# Patient Record
Sex: MALE | Race: WHITE | Employment: FULL TIME | URBAN - METROPOLITAN AREA
[De-identification: names, ages, dates, MRNs, and addresses within clinical notes are randomized per-mention and may not be internally consistent; named-entity substitution may affect disease eponyms.]

---

## 2017-01-03 RX ORDER — IBUPROFEN 800 MG/1
TABLET ORAL
Qty: 120 TABLET | Refills: 2 | Status: SHIPPED | OUTPATIENT
Start: 2017-01-03 | End: 2018-11-07 | Stop reason: SDUPTHER

## 2017-06-08 ENCOUNTER — TELEPHONE (OUTPATIENT)
Dept: FAMILY MEDICINE CLINIC | Age: 52
End: 2017-06-08

## 2017-06-08 ENCOUNTER — OFFICE VISIT (OUTPATIENT)
Dept: FAMILY MEDICINE CLINIC | Age: 52
End: 2017-06-08

## 2017-06-08 VITALS
HEIGHT: 74 IN | TEMPERATURE: 96.8 F | DIASTOLIC BLOOD PRESSURE: 84 MMHG | OXYGEN SATURATION: 98 % | SYSTOLIC BLOOD PRESSURE: 128 MMHG | HEART RATE: 58 BPM | BODY MASS INDEX: 32.03 KG/M2 | WEIGHT: 249.56 LBS

## 2017-06-08 DIAGNOSIS — I49.9 IRREGULAR HEART BEAT: ICD-10-CM

## 2017-06-08 DIAGNOSIS — Z00.00 ANNUAL PHYSICAL EXAM: Primary | ICD-10-CM

## 2017-06-08 DIAGNOSIS — Z12.5 SCREENING PSA (PROSTATE SPECIFIC ANTIGEN): ICD-10-CM

## 2017-06-08 DIAGNOSIS — Z12.11 SCREEN FOR COLON CANCER: ICD-10-CM

## 2017-06-08 DIAGNOSIS — Z00.00 ANNUAL PHYSICAL EXAM: ICD-10-CM

## 2017-06-08 LAB
CHOLESTEROL, TOTAL: 225 MG/DL (ref 0–199)
HDLC SERPL-MCNC: 43 MG/DL (ref 40–59)
LDL CHOLESTEROL CALCULATED: 165 MG/DL (ref 0–129)
PROSTATE SPECIFIC ANTIGEN: 1.39 NG/ML (ref 0–3.89)
TRIGL SERPL-MCNC: 86 MG/DL (ref 0–200)

## 2017-06-08 PROCEDURE — 99396 PREV VISIT EST AGE 40-64: CPT | Performed by: FAMILY MEDICINE

## 2017-06-08 PROCEDURE — 93000 ELECTROCARDIOGRAM COMPLETE: CPT | Performed by: FAMILY MEDICINE

## 2017-06-08 ASSESSMENT — PATIENT HEALTH QUESTIONNAIRE - PHQ9
1. LITTLE INTEREST OR PLEASURE IN DOING THINGS: 0
2. FEELING DOWN, DEPRESSED OR HOPELESS: 0
SUM OF ALL RESPONSES TO PHQ9 QUESTIONS 1 & 2: 0
SUM OF ALL RESPONSES TO PHQ QUESTIONS 1-9: 0

## 2017-12-18 ENCOUNTER — NURSE ONLY (OUTPATIENT)
Dept: FAMILY MEDICINE CLINIC | Age: 52
End: 2017-12-18

## 2017-12-18 DIAGNOSIS — Z23 NEED FOR INFLUENZA VACCINATION: Primary | ICD-10-CM

## 2017-12-18 PROCEDURE — 90471 IMMUNIZATION ADMIN: CPT | Performed by: FAMILY MEDICINE

## 2017-12-18 PROCEDURE — 90688 IIV4 VACCINE SPLT 0.5 ML IM: CPT | Performed by: FAMILY MEDICINE

## 2018-11-07 ENCOUNTER — OFFICE VISIT (OUTPATIENT)
Dept: FAMILY MEDICINE CLINIC | Age: 53
End: 2018-11-07
Payer: COMMERCIAL

## 2018-11-07 VITALS
HEIGHT: 74 IN | DIASTOLIC BLOOD PRESSURE: 82 MMHG | RESPIRATION RATE: 20 BRPM | WEIGHT: 260.6 LBS | HEART RATE: 73 BPM | TEMPERATURE: 97.3 F | SYSTOLIC BLOOD PRESSURE: 128 MMHG | BODY MASS INDEX: 33.45 KG/M2

## 2018-11-07 DIAGNOSIS — E78.5 DYSLIPIDEMIA: ICD-10-CM

## 2018-11-07 DIAGNOSIS — Z00.00 ANNUAL PHYSICAL EXAM: ICD-10-CM

## 2018-11-07 DIAGNOSIS — Z00.00 ANNUAL PHYSICAL EXAM: Primary | ICD-10-CM

## 2018-11-07 DIAGNOSIS — N40.0 BENIGN PROSTATIC HYPERPLASIA WITHOUT LOWER URINARY TRACT SYMPTOMS: ICD-10-CM

## 2018-11-07 DIAGNOSIS — N52.01 ERECTILE DYSFUNCTION DUE TO ARTERIAL INSUFFICIENCY: ICD-10-CM

## 2018-11-07 LAB
ALBUMIN SERPL-MCNC: 4.4 G/DL (ref 3.9–4.9)
ALP BLD-CCNC: 61 U/L (ref 35–104)
ALT SERPL-CCNC: 22 U/L (ref 0–41)
ANION GAP SERPL CALCULATED.3IONS-SCNC: 12 MEQ/L (ref 7–13)
AST SERPL-CCNC: 28 U/L (ref 0–40)
BASOPHILS ABSOLUTE: 0 K/UL (ref 0–0.2)
BASOPHILS RELATIVE PERCENT: 0.5 %
BILIRUB SERPL-MCNC: 1 MG/DL (ref 0–1.2)
BUN BLDV-MCNC: 22 MG/DL (ref 6–20)
CALCIUM SERPL-MCNC: 9.8 MG/DL (ref 8.6–10.2)
CHLORIDE BLD-SCNC: 102 MEQ/L (ref 98–107)
CHOLESTEROL, TOTAL: 267 MG/DL (ref 0–199)
CO2: 26 MEQ/L (ref 22–29)
CREAT SERPL-MCNC: 1 MG/DL (ref 0.7–1.2)
EOSINOPHILS ABSOLUTE: 0 K/UL (ref 0–0.7)
EOSINOPHILS RELATIVE PERCENT: 0.9 %
GFR AFRICAN AMERICAN: >60
GFR NON-AFRICAN AMERICAN: >60
GLOBULIN: 3.5 G/DL (ref 2.3–3.5)
GLUCOSE BLD-MCNC: 80 MG/DL (ref 74–109)
HCT VFR BLD CALC: 42 % (ref 42–52)
HDLC SERPL-MCNC: 47 MG/DL (ref 40–59)
HEMOGLOBIN: 14.4 G/DL (ref 14–18)
LDL CHOLESTEROL CALCULATED: 199 MG/DL (ref 0–129)
LYMPHOCYTES ABSOLUTE: 1.7 K/UL (ref 1–4.8)
LYMPHOCYTES RELATIVE PERCENT: 38 %
MCH RBC QN AUTO: 33.6 PG (ref 27–31.3)
MCHC RBC AUTO-ENTMCNC: 34.3 % (ref 33–37)
MCV RBC AUTO: 97.9 FL (ref 80–100)
MONOCYTES ABSOLUTE: 0.6 K/UL (ref 0.2–0.8)
MONOCYTES RELATIVE PERCENT: 12.7 %
NEUTROPHILS ABSOLUTE: 2.2 K/UL (ref 1.4–6.5)
NEUTROPHILS RELATIVE PERCENT: 47.9 %
PDW BLD-RTO: 13.7 % (ref 11.5–14.5)
PLATELET # BLD: 170 K/UL (ref 130–400)
POTASSIUM SERPL-SCNC: 4.5 MEQ/L (ref 3.5–5.1)
RBC # BLD: 4.29 M/UL (ref 4.7–6.1)
SODIUM BLD-SCNC: 140 MEQ/L (ref 132–144)
TOTAL PROTEIN: 7.9 G/DL (ref 6.4–8.1)
TRIGL SERPL-MCNC: 104 MG/DL (ref 0–200)
WBC # BLD: 4.5 K/UL (ref 4.8–10.8)

## 2018-11-07 PROCEDURE — 99396 PREV VISIT EST AGE 40-64: CPT | Performed by: FAMILY MEDICINE

## 2018-11-07 RX ORDER — SILDENAFIL 100 MG/1
100 TABLET, FILM COATED ORAL PRN
Qty: 10 TABLET | Refills: 3 | Status: SHIPPED | OUTPATIENT
Start: 2018-11-07

## 2018-11-07 RX ORDER — LEVOCETIRIZINE DIHYDROCHLORIDE 5 MG/1
TABLET, FILM COATED ORAL
Qty: 90 TABLET | Refills: 1 | Status: SHIPPED | OUTPATIENT
Start: 2018-11-07

## 2018-11-07 RX ORDER — IBUPROFEN 800 MG/1
TABLET ORAL
Qty: 120 TABLET | Refills: 2 | Status: SHIPPED | OUTPATIENT
Start: 2018-11-07

## 2018-11-07 ASSESSMENT — PATIENT HEALTH QUESTIONNAIRE - PHQ9
2. FEELING DOWN, DEPRESSED OR HOPELESS: 0
SUM OF ALL RESPONSES TO PHQ9 QUESTIONS 1 & 2: 0
SUM OF ALL RESPONSES TO PHQ QUESTIONS 1-9: 0
SUM OF ALL RESPONSES TO PHQ QUESTIONS 1-9: 0
1. LITTLE INTEREST OR PLEASURE IN DOING THINGS: 0

## 2018-11-07 NOTE — PROGRESS NOTES
Dyslipidemia      Past Surgical History:   Procedure Laterality Date    EYE SURGERY      UVULECTOMY      VASECTOMY           REVIEW OF SYSTEMS:   Patient seen today for exam.  Denies any problems with hearing, headaches or vision. Denies any shortness of breath, chest pain, nausea or vomiting. No black stool, no blood in thestool. No heartburn. Denies any problems with constipation or diarrhea either. No dysuria type symptoms. Objective:     /82   Pulse 73   Temp 97.3 °F (36.3 °C) (Temporal)   Resp 20   Ht 6' 2\" (1.88 m)   Wt 260 lb 9.6 oz (118.2 kg)   BMI 33.46 kg/m²     Physical Exam        O:  Alert and active male in no acute distress  HEENT:  TMs clear. Pharynx neg. Nares clear, no drainage noted  Neck supple/ no adenopathy   HEART:  RRR without murmur/ no carotid bruits  LUNGS:  Clear to auscultation bilaterally, no wheeze or rhonchi noted  THYROID: neg masses or nodularity  ABDOMEN:  Soft x4. Bowel sounds positive. No masses or organomegaly,  Negative tenderness, guarding or rebound. EXTR:  Without edema./ good pulses bilat    Neurologic exam unremarkable. DTRs in upper and lower extremities within normal limits. Full strength noted    Skin- no lesions noted   Testes descended no masses    Assessment:       Diagnosis Orders   1. Annual physical exam  Lipid Panel    Comprehensive Metabolic Panel    CBC With Auto Differential   2. Benign prostatic hyperplasia without lower urinary tract symptoms  PSA, Diagnostic   3. Dyslipidemia     4.  Erectile dysfunction due to arterial insufficiency               Plan:        Orders Placed This Encounter   Medications    ibuprofen (ADVIL;MOTRIN) 800 MG tablet     Sig: TAKE 1 TABLET BY MOUTH EVERY 6 HOURS AS NEEDED     Dispense:  120 tablet     Refill:  2    sildenafil (VIAGRA) 100 MG tablet     Sig: Take 1 tablet by mouth as needed for Erectile Dysfunction     Dispense:  10 tablet     Refill:  3    levocetirizine (XYZAL) 5 MG tablet Sig: TAKE ONE TABLET BY MOUTH EVERY EVENING     Dispense:  90 tablet     Refill:  1     Orders Placed This Encounter   Procedures    Lipid Panel     Standing Status:   Future     Standing Expiration Date:   11/7/2019     Order Specific Question:   Is Patient Fasting?/# of Hours     Answer:   n/a    Comprehensive Metabolic Panel     Standing Status:   Future     Standing Expiration Date:   11/7/2019    CBC With Auto Differential     Standing Status:   Future     Standing Expiration Date:   11/7/2019    PSA, Diagnostic     Standing Status:   Future     Standing Expiration Date:   11/7/2019           Health Maintenance Due   Topic Date Due    Colon cancer screen colonoscopy  03/09/2017        we discussed Mediterranean diet and lifestyle increase activity and no sugary drinks with his blood work and give her call to see how he does with the Viagra      Controlled Substances Monitoring:     No flowsheet data found.         If anything worsens or changes please call us at once , follow-up in the office as planned,

## 2019-03-28 ENCOUNTER — TELEPHONE (OUTPATIENT)
Dept: FAMILY MEDICINE CLINIC | Age: 54
End: 2019-03-28

## 2023-03-27 PROBLEM — R53.81 MALAISE AND FATIGUE: Status: ACTIVE | Noted: 2023-03-27

## 2023-03-27 PROBLEM — D22.9 SKIN MOLE: Status: ACTIVE | Noted: 2023-03-27

## 2023-03-27 PROBLEM — M17.9 OSTEOARTHRITIS OF KNEE: Status: ACTIVE | Noted: 2023-03-27

## 2023-03-27 PROBLEM — M23.90 INTERNAL DERANGEMENT OF KNEE: Status: ACTIVE | Noted: 2023-03-27

## 2023-03-27 PROBLEM — L25.9 CONTACT DERMATITIS: Status: ACTIVE | Noted: 2023-03-27

## 2023-03-27 PROBLEM — S83.529A TEAR OF PCL (POSTERIOR CRUCIATE LIGAMENT) OF KNEE: Status: ACTIVE | Noted: 2023-03-27

## 2023-03-27 PROBLEM — E66.811 CLASS 1 OBESITY DUE TO EXCESS CALORIES WITH SERIOUS COMORBIDITY AND BODY MASS INDEX (BMI) OF 30.0 TO 30.9 IN ADULT: Status: ACTIVE | Noted: 2023-03-27

## 2023-03-27 PROBLEM — M79.673 FOOT PAIN: Status: ACTIVE | Noted: 2023-03-27

## 2023-03-27 PROBLEM — E78.5 DYSLIPIDEMIA: Status: ACTIVE | Noted: 2023-03-27

## 2023-03-27 PROBLEM — L24.7 IRRITANT CONTACT DERMATITIS DUE TO PLANTS, EXCEPT FOOD: Status: ACTIVE | Noted: 2023-03-27

## 2023-03-27 PROBLEM — M10.9 GOUT OF RIGHT FOOT: Status: ACTIVE | Noted: 2023-03-27

## 2023-03-27 PROBLEM — S92.153A AVULSION FRACTURE OF TALUS: Status: ACTIVE | Noted: 2023-03-27

## 2023-03-27 PROBLEM — R03.0 BORDERLINE HYPERTENSION: Status: ACTIVE | Noted: 2023-03-27

## 2023-03-27 PROBLEM — M25.561 KNEE PAIN, RIGHT: Status: ACTIVE | Noted: 2023-03-27

## 2023-03-27 PROBLEM — S92.353A FRACTURE OF BASE OF FIFTH METATARSAL BONE: Status: ACTIVE | Noted: 2023-03-27

## 2023-03-27 PROBLEM — M25.871 IMPINGEMENT OF RIGHT ANKLE JOINT: Status: ACTIVE | Noted: 2023-03-27

## 2023-03-27 PROBLEM — N52.9 ERECTILE DYSFUNCTION: Status: ACTIVE | Noted: 2023-03-27

## 2023-03-27 PROBLEM — L82.1 SEBORRHEIC KERATOSIS: Status: ACTIVE | Noted: 2023-03-27

## 2023-03-27 PROBLEM — R53.83 MALAISE AND FATIGUE: Status: ACTIVE | Noted: 2023-03-27

## 2023-03-27 PROBLEM — T78.40XA ALLERGY: Status: ACTIVE | Noted: 2023-03-27

## 2023-03-27 PROBLEM — M25.569 KNEE PAIN: Status: ACTIVE | Noted: 2023-03-27

## 2023-03-27 PROBLEM — M79.671 RIGHT FOOT PAIN: Status: ACTIVE | Noted: 2023-03-27

## 2023-03-27 PROBLEM — E66.09 CLASS 1 OBESITY DUE TO EXCESS CALORIES WITH SERIOUS COMORBIDITY AND BODY MASS INDEX (BMI) OF 30.0 TO 30.9 IN ADULT: Status: ACTIVE | Noted: 2023-03-27

## 2023-03-27 RX ORDER — LEVOCETIRIZINE DIHYDROCHLORIDE 5 MG/1
1 TABLET, FILM COATED ORAL DAILY
COMMUNITY
Start: 2018-11-07 | End: 2023-07-28

## 2023-03-27 RX ORDER — MELOXICAM 15 MG/1
15 TABLET ORAL DAILY
COMMUNITY

## 2023-03-27 RX ORDER — IBUPROFEN 800 MG/1
TABLET ORAL
COMMUNITY
Start: 2019-08-21 | End: 2023-03-30 | Stop reason: SDUPTHER

## 2023-03-27 RX ORDER — SILDENAFIL 100 MG/1
TABLET, FILM COATED ORAL
COMMUNITY
Start: 2018-11-07

## 2023-03-30 ENCOUNTER — OFFICE VISIT (OUTPATIENT)
Dept: PRIMARY CARE | Facility: CLINIC | Age: 58
End: 2023-03-30
Payer: COMMERCIAL

## 2023-03-30 VITALS
OXYGEN SATURATION: 99 % | SYSTOLIC BLOOD PRESSURE: 132 MMHG | DIASTOLIC BLOOD PRESSURE: 78 MMHG | WEIGHT: 238.4 LBS | BODY MASS INDEX: 30.6 KG/M2 | HEIGHT: 74 IN | TEMPERATURE: 97.6 F | HEART RATE: 58 BPM

## 2023-03-30 DIAGNOSIS — M25.561 CHRONIC PAIN OF RIGHT KNEE: ICD-10-CM

## 2023-03-30 DIAGNOSIS — G89.29 CHRONIC PAIN OF RIGHT KNEE: ICD-10-CM

## 2023-03-30 DIAGNOSIS — L02.421 FURUNCLE OF RIGHT AXILLA: ICD-10-CM

## 2023-03-30 DIAGNOSIS — Z00.00 ROUTINE GENERAL MEDICAL EXAMINATION AT A HEALTH CARE FACILITY: Primary | ICD-10-CM

## 2023-03-30 DIAGNOSIS — F41.9 ANXIETY: ICD-10-CM

## 2023-03-30 DIAGNOSIS — Z12.11 COLON CANCER SCREENING: ICD-10-CM

## 2023-03-30 PROCEDURE — 99396 PREV VISIT EST AGE 40-64: CPT | Performed by: FAMILY MEDICINE

## 2023-03-30 PROCEDURE — 4004F PT TOBACCO SCREEN RCVD TLK: CPT | Performed by: FAMILY MEDICINE

## 2023-03-30 RX ORDER — CEPHALEXIN 500 MG/1
500 CAPSULE ORAL 2 TIMES DAILY
Qty: 20 CAPSULE | Refills: 0 | Status: SHIPPED | OUTPATIENT
Start: 2023-03-30 | End: 2023-04-09

## 2023-03-30 RX ORDER — IBUPROFEN 800 MG/1
TABLET ORAL
Qty: 270 TABLET | Refills: 1 | Status: SHIPPED | OUTPATIENT
Start: 2023-03-30

## 2023-03-30 RX ORDER — ESCITALOPRAM OXALATE 10 MG/1
10 TABLET ORAL DAILY
Qty: 30 TABLET | Refills: 5 | Status: SHIPPED | OUTPATIENT
Start: 2023-03-30 | End: 2023-09-26

## 2023-03-30 NOTE — LETTER
April 12, 2023     Adonay Stephens  [id number] [facility] [id number]      Dear Mr. Stephens:    Below are the results from your recent visit:    Resulted Orders   Cologuard® colon cancer screening   Result Value Ref Range    NONINV COLON CA DNA+OCC BLD SCRN STL QL Negative Negative      Comment:        NEGATIVE TEST RESULT. A negative Cologuard result indicates a low likelihood that a colorectal cancer (CRC) or advanced adenoma (adenomatous polyps with more advanced pre-malignant features)  is present. The chance that a person with a negative Cologuard test has a colorectal cancer is less than 1 in 1500 (negative predictive value >99.9%) or has an  advanced adenoma is less than  5.3% (negative predictive value 94.7%). These data are based on a prospective cross-sectional study of 10,000 individuals at average risk for colorectal cancer who were screened with both Cologuard and colonoscopy. (Anali Eddy al, N Engl J Med 2014;370(14):7392-4054) The normal value (reference range) for this assay is negative.    COLOGUARD RE-SCREENING RECOMMENDATION: Periodic colorectal cancer screening is an important part of preventive healthcare for asymptomatic individuals at average risk for colorectal cancer.  Following a negative Cologuard result, the American Cancer Society and U.S.  Multi-Society Task Force screening guidelines recommend a Cologuard re-screening interval of 3 years.   References: American Cancer Society Guideline for Colorectal Cancer Screening: https://www.cancer.org/cancer/colon-rectal-cancer/aecddrczm-xozfagdwp-gmthpgp/acs-recommendations.html.; Mrelin DK, Rosa PEREZ, Tito YANESK, Colorectal Cancer Screening: Recommendations for Physicians and Patients from the U.S. Multi-Society Task Force on Colorectal Cancer Screening , Am J Gastroenterology 2017; 112:4494-8494.    TEST DESCRIPTION: Composite algorithmic analysis of stool DNA-biomarkers with hemoglobin immunoassay.   Quantitative values of  individual biomarkers are not reportable and are not associated with individual biomarker result reference ranges. Cologuard is intended for colorectal cancer screening of adults of either sex, 45 years or older, who are at average-risk for colorectal cancer (CRC). Cologuard has been approved for use by the U.S. FDA. The performance of Cologuard was  established in a cross sectional study of average-risk adults aged 50-84. Cologuard performance in patients ages 45 to 49 years was estimated by sub-group analysis of near-age groups. Colonoscopies performed for a positive result may find as the most clinically significant lesion: colorectal cancer [4.0%], advanced adenoma (including sessile serrated polyps greater than or equal to 1cm diameter) [20%] or non- advanced adenoma [31%]; or no colorectal neoplasia [45%]. These estimates are derived from a prospective cross-sectional screening study of 10,000 individuals at average risk for colorectal cancer who were screened with both Cologuard and colonoscopy. (Anali MARTINEZ. et al, N Engl J Med 2014;370(14):6542-9955.) Cologuard may produce a false negative or false positive result (no colorectal cancer or precancerous polyp present at colonoscopy follow up). A negative Cologuard test result does not guarantee the absence of CRC or advanced adenoma (pre-cancer). The current Cologuard  screening interval is every 3 years. (American Cancer Society and U.S. Multi-Society Task Force). Cologuard performance data in a 10,000 patient pivotal study using colonoscopy as the reference method can be accessed at the following location: www.Inn[employer]s.AppMakr/results. Additional description of the Cologuard test process, warnings and precautions can be found at [email or web address].com.       Results within normal range. Recheck in 3 years. Follow-up as planned     If you have any questions or concerns, please don't hesitate to call.         Sincerely,        Teofilo Banerjee, DO

## 2023-03-30 NOTE — PROGRESS NOTES
"Subjective   Patient ID: Adonay Stephens is a 58 y.o. male who presents for Annual Exam and Knee Pain (Cyst).  HPI  Annual physical   Eats a generally healthy diet   Exercises infrequently   Denies any chest pain,SOB  No Abdominal pain   No black or bloody stools   Urination/BM normal   Last eye apt x 1 year ago   Last dental apt  x over 1 year   No new family h/o cancers or heart disease     Knee pain   Right x over 2 years   No specific injury noted  Has seen Orthopedics   Is taking Ibuprofen  Pain has not changes     Cyst   Right axilla x 5 days  Admits to pain when touched   Located under the skin  This is a recurring issue  Would like this checked today   No fevers or chills   Has never had these removed previously     Review of systems  ; Patient seen today for exam denies any problems with headaches or vision, denies any shortness of breath chest pain nausea or vomiting, no black stool no blood in the stool no heartburn type symptoms denies any problems with constipation or diarrhea, and no dysuria-type symptoms    The patient's allergies medications were reviewed with them today    The patient's social family and surgical history or also reviewed here today, along with her past medical history.     Objective     Alert and active in  no acute distress  HEENT TMs clear oropharynx negative nares clear no drainage noted neck supple  With no adenopathy   Heart regular rate and rhythm without murmur and no carotid bruits  Lungs- clear to auscultation bilaterally, no wheeze or rhonchi noted  Thyroid -negative masses or nodularity  Abdomen- soft times four quadrants , bowel sounds positive no masses or organomegaly, negative tenderness guarding or rebound  Neurological exam unremarkable- DTRs in upper and lower extremities within normal limits.   skin -no lesions noted    Right axilla positive for: Noted slightly tender to touch    /78   Pulse 58   Temp 36.4 °C (97.6 °F) (Temporal)   Ht 1.88 m (6' 2\")   Wt " 108 kg (238 lb 6.4 oz)   SpO2 99%   BMI 30.61 kg/m²     No Known Allergies    Assessment/Plan   Problem List Items Addressed This Visit          Other    Knee pain, right    Relevant Medications    ibuprofen 800 mg tablet     Other Visit Diagnoses       Routine general medical examination at a health care facility    -  Primary    Colon cancer screening        Relevant Orders    Cologuard® colon cancer screening    Furuncle of right axilla        Relevant Medications    cephalexin (Keflex) 500 mg capsule    Anxiety        Relevant Medications    escitalopram (Lexapro) 10 mg tablet        He will try the Lexapro for next month with his anxiety with his right eye he did buy Katina which has been great for helping him stay centered with his eye and also with right-sided blind spots      Reviewed his bladder with him if things worsen or change in any way he will let us know we will take Keflex as ordered      If anything worsens or changes please call us at once, follow up in the office as planned,

## 2023-04-10 LAB — NONINV COLON CA DNA+OCC BLD SCRN STL QL: NEGATIVE

## 2023-07-28 DIAGNOSIS — T78.40XS ALLERGY, SEQUELA: ICD-10-CM

## 2023-07-28 RX ORDER — LEVOCETIRIZINE DIHYDROCHLORIDE 5 MG/1
5 TABLET, FILM COATED ORAL DAILY
Qty: 30 TABLET | Refills: 0 | Status: SHIPPED | OUTPATIENT
Start: 2023-07-28

## 2023-10-10 ENCOUNTER — ANCILLARY PROCEDURE (OUTPATIENT)
Dept: RADIOLOGY | Facility: CLINIC | Age: 58
End: 2023-10-10
Payer: COMMERCIAL

## 2023-10-10 DIAGNOSIS — M25.569 PAIN IN UNSPECIFIED KNEE: ICD-10-CM

## 2023-10-10 DIAGNOSIS — S83.207A UNSPECIFIED TEAR OF UNSPECIFIED MENISCUS, CURRENT INJURY, LEFT KNEE, INITIAL ENCOUNTER: ICD-10-CM

## 2023-10-10 DIAGNOSIS — M25.561 PAIN IN RIGHT KNEE: ICD-10-CM

## 2023-10-10 DIAGNOSIS — S83.206A UNSPECIFIED TEAR OF UNSPECIFIED MENISCUS, CURRENT INJURY, RIGHT KNEE, INITIAL ENCOUNTER: ICD-10-CM

## 2023-10-10 PROCEDURE — 73721 MRI JNT OF LWR EXTRE W/O DYE: CPT | Mod: LT

## 2023-10-10 PROCEDURE — 73721 MRI JNT OF LWR EXTRE W/O DYE: CPT | Mod: LEFT SIDE | Performed by: RADIOLOGY

## 2023-10-11 ENCOUNTER — DOCUMENTATION (OUTPATIENT)
Dept: ORTHOPEDIC SURGERY | Facility: CLINIC | Age: 58
End: 2023-10-11
Payer: COMMERCIAL

## 2023-10-11 DIAGNOSIS — M25.569 KNEE PAIN, UNSPECIFIED CHRONICITY, UNSPECIFIED LATERALITY: Primary | ICD-10-CM

## 2023-10-11 NOTE — PROGRESS NOTES
Discussed with patient there is edema in his proximal medial tibia consistent with early insufficiency fracture, we stressed the importance of minimal weightbearing and rest however he is going elk hunting in [place].  We discussed modification of his activities as best as possible we reviewed increased rest and protected weightbearing potentially upon his return.      He does have a radial tear of the posterior horn of his medial meniscus we did message radiology as this was not mentioned.  We will see him back upon his return from the trip.

## 2023-10-31 ENCOUNTER — OFFICE VISIT (OUTPATIENT)
Dept: ORTHOPEDIC SURGERY | Facility: CLINIC | Age: 58
End: 2023-10-31
Payer: COMMERCIAL

## 2023-10-31 DIAGNOSIS — M25.562 ACUTE PAIN OF LEFT KNEE: Primary | ICD-10-CM

## 2023-10-31 PROBLEM — S83.242A OTHER TEAR OF MEDIAL MENISCUS, CURRENT INJURY, LEFT KNEE, INITIAL ENCOUNTER: Status: ACTIVE | Noted: 2023-10-31

## 2023-10-31 PROCEDURE — 4004F PT TOBACCO SCREEN RCVD TLK: CPT | Performed by: ORTHOPAEDIC SURGERY

## 2023-10-31 PROCEDURE — 99214 OFFICE O/P EST MOD 30 MIN: CPT | Mod: 57 | Performed by: ORTHOPAEDIC SURGERY

## 2023-10-31 PROCEDURE — 99214 OFFICE O/P EST MOD 30 MIN: CPT | Performed by: ORTHOPAEDIC SURGERY

## 2023-10-31 RX ORDER — NAPROXEN 500 MG/1
500 TABLET ORAL 2 TIMES DAILY
Qty: 60 TABLET | Refills: 0 | Status: SHIPPED | OUTPATIENT
Start: 2023-10-31 | End: 2023-11-30

## 2023-10-31 NOTE — H&P
History Of Present Illness  Adonay Stephens is a 58 y.o. male presenting with left knee pain, here to review MRI.     Past Medical History  He has a past medical history of Body mass index (BMI) 33.0-33.9, adult (08/23/2021), Encounter for general adult medical examination without abnormal findings (08/26/2020), Encounter for immunization (10/29/2020), Other obesity due to excess calories (08/23/2021), Personal history of other diseases of the nervous system and sense organs (02/22/2022), and Unspecified disorder of ear, unspecified ear (02/22/2022).    Surgical History  He has a past surgical history that includes Other surgical history (12/01/2020) and Other surgical history (12/01/2020).     Social History  He reports that he has never smoked. His smokeless tobacco use includes snuff. He reports current alcohol use. No history on file for drug use.    Family History  Family History   Problem Relation Name Age of Onset    Heart attack Father          Allergies  Patient has no known allergies.    Review of Systems  ROS negative to cardiovascular, pulmonary, abdominal, and neuro.     Physical Exam  Examination of knee reveals positive Joselito's test on the left with medial joint line tenderness.     Last Recorded Vitals  There were no vitals taken for this visit.    Relevant Results      Scheduled medications    Continuous medications    PRN medications    No results found for this or any previous visit (from the past 24 hour(s)).    Assessment/Plan   Diagnoses and all orders for this visit:  Acute pain of left knee  -     naproxen (Naprosyn) 500 mg tablet; Take 1 tablet (500 mg) by mouth 2 times a day.      Discussed arthroscopic procedure with the patient for partial medial meniscectomy, patient agreeable like to proceed.       I spent 10 minutes in the professional and overall care of this patient.      Rashi Rock PA-C

## 2023-10-31 NOTE — PROGRESS NOTES
History of Present Illness:   Patient returns today to review MRI.  The patient endorses persistent knee pain and persistent mechanical symptoms including locking, catching, and giving out.  These issues are refractory to multiple non-surgical treatments.    Review of Systems   GENERAL: Negative for malaise, significant weight loss, fever  MUSCULOSKELETAL: See HPI  NEURO:  Negative for numbness / tingling     Physical Examination:  Left Knee:  Skin healthy and intact  No gross varus or valgus alignment   Range of motion: no major deficits   Tenderness to palpation over joint line: medial joint line  No laxity to valgus stress  No laxity to varus stress  Negative Lachman´s test  Negative anterior drawer test  Negative posterior drawer test  Positive Joselito´s test  Neurovascular exam normal distally    Imaging  MRI: Radial tear posterior horn medial meniscus insufficiency fracture    Assessment:    Patient with a left knee medial meniscal tear.    Plan:  We discussed arthroscopy versus nonsurgical treatment for the patient´s meniscal tear.  We reviewed the blood supply to the meniscus, limited healing potential and meniscectomy versus meniscal repair.  The risks of the procedure were mentioned, including wound issues, deep venous thrombosis, pulmonary embolism and medical complications.  The anticipated timeframe for recovery and return to activity were outlined.  We discussed formal physical therapy versus home exercise program.    We mentioned the possibility of incomplete relief of pain, particularly if any chondral defects are encountered and the possibility of arthrosis of the knee related to long-term deficiencies of the meniscus.         In a face to face encounter, I evaluated the patient and performed a physical examination, discussed pertinent diagnostic studies if indicated and discussed diagnosis and management strategies with both the patient and physician assistant / nurse practitioner.  I  reviewed the PA/NP's note and agree with the documented findings and plan of care.    Discussed with the patient that he does have a tear of the medial meniscus we reviewed the images.  We discussed recovery.  There also is an insufficiency fracture and discussed protected weightbearing postoperatively.  We also reviewed the patellar femoral arthritis.    Julian Wilson MD

## 2023-11-20 ENCOUNTER — HOSPITAL ENCOUNTER (OUTPATIENT)
Dept: CARDIOLOGY | Facility: HOSPITAL | Age: 58
Discharge: HOME | End: 2023-11-20
Payer: COMMERCIAL

## 2023-11-20 DIAGNOSIS — S83.242A OTHER TEAR OF MEDIAL MENISCUS, CURRENT INJURY, LEFT KNEE, INITIAL ENCOUNTER: ICD-10-CM

## 2023-11-20 PROCEDURE — 93005 ELECTROCARDIOGRAM TRACING: CPT

## 2023-11-20 PROCEDURE — 93010 ELECTROCARDIOGRAM REPORT: CPT | Performed by: INTERNAL MEDICINE

## 2023-12-01 LAB
ATRIAL RATE: 67 BPM
P AXIS: 46 DEGREES
P OFFSET: 207 MS
P ONSET: 149 MS
PR INTERVAL: 146 MS
Q ONSET: 222 MS
QRS COUNT: 11 BEATS
QRS DURATION: 88 MS
QT INTERVAL: 406 MS
QTC CALCULATION(BAZETT): 429 MS
QTC FREDERICIA: 421 MS
R AXIS: -25 DEGREES
T AXIS: -14 DEGREES
T OFFSET: 425 MS
VENTRICULAR RATE: 67 BPM

## 2023-12-13 ENCOUNTER — LAB (OUTPATIENT)
Dept: LAB | Facility: HOSPITAL | Age: 58
End: 2023-12-13
Payer: COMMERCIAL

## 2023-12-13 DIAGNOSIS — S83.242A OTHER TEAR OF MEDIAL MENISCUS, CURRENT INJURY, LEFT KNEE, INITIAL ENCOUNTER: ICD-10-CM

## 2023-12-13 LAB
ALBUMIN SERPL BCP-MCNC: 4.3 G/DL (ref 3.4–5)
ALP SERPL-CCNC: 52 U/L (ref 33–120)
ALT SERPL W P-5'-P-CCNC: 21 U/L (ref 10–52)
ANION GAP SERPL CALC-SCNC: 11 MMOL/L (ref 10–20)
AST SERPL W P-5'-P-CCNC: 23 U/L (ref 9–39)
BASOPHILS # BLD AUTO: 0.02 X10*3/UL (ref 0–0.1)
BASOPHILS NFR BLD AUTO: 0.5 %
BILIRUB SERPL-MCNC: 0.6 MG/DL (ref 0–1.2)
BUN SERPL-MCNC: 23 MG/DL (ref 6–23)
CALCIUM SERPL-MCNC: 9.7 MG/DL (ref 8.6–10.3)
CHLORIDE SERPL-SCNC: 104 MMOL/L (ref 98–107)
CO2 SERPL-SCNC: 30 MMOL/L (ref 21–32)
CREAT SERPL-MCNC: 1.23 MG/DL (ref 0.5–1.3)
EOSINOPHIL # BLD AUTO: 0.12 X10*3/UL (ref 0–0.7)
EOSINOPHIL NFR BLD AUTO: 2.9 %
ERYTHROCYTE [DISTWIDTH] IN BLOOD BY AUTOMATED COUNT: 12.9 % (ref 11.5–14.5)
GFR SERPL CREATININE-BSD FRML MDRD: 69 ML/MIN/1.73M*2
GLUCOSE SERPL-MCNC: 77 MG/DL (ref 74–99)
HCT VFR BLD AUTO: 42.4 % (ref 41–52)
HGB BLD-MCNC: 14.2 G/DL (ref 13.5–17.5)
IMM GRANULOCYTES # BLD AUTO: 0.01 X10*3/UL (ref 0–0.7)
IMM GRANULOCYTES NFR BLD AUTO: 0.2 % (ref 0–0.9)
INR PPP: 1 (ref 0.9–1.1)
LYMPHOCYTES # BLD AUTO: 1.81 X10*3/UL (ref 1.2–4.8)
LYMPHOCYTES NFR BLD AUTO: 43.1 %
MCH RBC QN AUTO: 32.8 PG (ref 26–34)
MCHC RBC AUTO-ENTMCNC: 33.5 G/DL (ref 32–36)
MCV RBC AUTO: 98 FL (ref 80–100)
MONOCYTES # BLD AUTO: 0.66 X10*3/UL (ref 0.1–1)
MONOCYTES NFR BLD AUTO: 15.7 %
NEUTROPHILS # BLD AUTO: 1.58 X10*3/UL (ref 1.2–7.7)
NEUTROPHILS NFR BLD AUTO: 37.6 %
NRBC BLD-RTO: 0 /100 WBCS (ref 0–0)
PLATELET # BLD AUTO: 179 X10*3/UL (ref 150–450)
POTASSIUM SERPL-SCNC: 4.4 MMOL/L (ref 3.5–5.3)
PROT SERPL-MCNC: 7.5 G/DL (ref 6.4–8.2)
PROTHROMBIN TIME: 11.4 SECONDS (ref 9.8–12.8)
RBC # BLD AUTO: 4.33 X10*6/UL (ref 4.5–5.9)
SODIUM SERPL-SCNC: 141 MMOL/L (ref 136–145)
WBC # BLD AUTO: 4.2 X10*3/UL (ref 4.4–11.3)

## 2023-12-13 PROCEDURE — 80053 COMPREHEN METABOLIC PANEL: CPT

## 2023-12-13 PROCEDURE — 36415 COLL VENOUS BLD VENIPUNCTURE: CPT

## 2023-12-13 PROCEDURE — 85025 COMPLETE CBC W/AUTO DIFF WBC: CPT

## 2023-12-13 PROCEDURE — 82374 ASSAY BLOOD CARBON DIOXIDE: CPT

## 2023-12-13 PROCEDURE — 85610 PROTHROMBIN TIME: CPT

## 2023-12-19 ENCOUNTER — OFFICE VISIT (OUTPATIENT)
Dept: ORTHOPEDIC SURGERY | Facility: CLINIC | Age: 58
End: 2023-12-19
Payer: COMMERCIAL

## 2023-12-19 DIAGNOSIS — S83.242A TEAR OF MEDIAL MENISCUS OF LEFT KNEE, CURRENT, UNSPECIFIED TEAR TYPE, INITIAL ENCOUNTER: ICD-10-CM

## 2023-12-19 DIAGNOSIS — M23.92 INTERNAL DERANGEMENT OF LEFT KNEE: ICD-10-CM

## 2023-12-19 DIAGNOSIS — M17.11 PRIMARY OSTEOARTHRITIS OF RIGHT KNEE: Primary | ICD-10-CM

## 2023-12-19 PROCEDURE — 2500000004 HC RX 250 GENERAL PHARMACY W/ HCPCS (ALT 636 FOR OP/ED): Performed by: STUDENT IN AN ORGANIZED HEALTH CARE EDUCATION/TRAINING PROGRAM

## 2023-12-19 PROCEDURE — 99213 OFFICE O/P EST LOW 20 MIN: CPT | Performed by: STUDENT IN AN ORGANIZED HEALTH CARE EDUCATION/TRAINING PROGRAM

## 2023-12-19 PROCEDURE — 20610 DRAIN/INJ JOINT/BURSA W/O US: CPT | Performed by: STUDENT IN AN ORGANIZED HEALTH CARE EDUCATION/TRAINING PROGRAM

## 2023-12-19 PROCEDURE — 4004F PT TOBACCO SCREEN RCVD TLK: CPT | Performed by: STUDENT IN AN ORGANIZED HEALTH CARE EDUCATION/TRAINING PROGRAM

## 2023-12-19 RX ORDER — TRIAMCINOLONE ACETONIDE 40 MG/ML
1 INJECTION, SUSPENSION INTRA-ARTICULAR; INTRAMUSCULAR
Status: COMPLETED | OUTPATIENT
Start: 2023-12-19 | End: 2023-12-19

## 2023-12-19 RX ORDER — SODIUM CHLORIDE, SODIUM LACTATE, POTASSIUM CHLORIDE, CALCIUM CHLORIDE 600; 310; 30; 20 MG/100ML; MG/100ML; MG/100ML; MG/100ML
100 INJECTION, SOLUTION INTRAVENOUS CONTINUOUS
Status: CANCELLED | OUTPATIENT
Start: 2023-12-19

## 2023-12-19 RX ADMIN — TRIAMCINOLONE ACETONIDE 1 ML: 40 INJECTION, SUSPENSION INTRA-ARTICULAR; INTRAMUSCULAR at 14:46

## 2023-12-19 NOTE — PROGRESS NOTES
History of Present Illness:  Patient with known osteoarthritis of the knee who presents today for repeat evaluation.  The patient notes persistent pain as well as some occasional mechanical symptoms.  The patient has tried the following modalities: RICE, NSAIDs, prior arthroscopy with medial meniscectomy in 2021.    He is having worsening right knee pain, and would like CSI, he is having left knee scope soon.     Review of Systems:   GENERAL: Negative for malaise, significant weight loss, fever  MUSCULOSKELETAL: see HPI  NEURO:  Negative    Physical Examination:  Right Knee:  Skin healthy and intact  No gross swelling or ecchymosis  Alignment: Varus  Effusion: Trace  ROM:  0-120  Crepitance with range of motion  No pain with internal rotation of the hip  Tenderness to palpation: over medial and lateral joint line and with patellar compression     No laxity to valgus stress  No laxity to varus stress  Negative Lachman´s test  Negative posterior drawer test  Mild pain with Joselito´s test    Neurovascular exam normal distally  2+ DP pulse and good cap refill    Imaging:  Previous arthroscopic findings of the right knee are as follows:   Patella: 4   Trochlea: 4   Medial compartment: 2-3   Lateral compartment: 1     Assessment:  Patient with known osteoarthritis of the right knee    Plan:  We reviewed an evidence-based approach to OA of the knee.  We discussed past treatments as well options for future treatment.  We discussed NSAIDS (risks and benefits), low impact activities.   He is agreeable to CSI today in the right knee.   Will follow up after left knee scope in several weeks.     FELICIANO Mooney Inj/Asp: R knee on 12/19/2023 2:46 PM  Indications: pain  Details: 22 G needle, anterolateral approach  Medications: 1 mL triamcinolone acetonide 40 mg/mL  Procedure, treatment alternatives, risks and benefits explained, specific risks discussed. Consent was given by the patient. Immediately prior to  procedure a time out was called to verify the correct patient, procedure, equipment, support staff and site/side marked as required. Patient was prepped and draped in the usual sterile fashion.

## 2023-12-20 NOTE — PREPROCEDURE INSTRUCTIONS
Appropriate clothing, bring glasses case, center location, insurance information, remove jewerly, bring responsible adult as the . NPO after midnight. Patient will bring crutches and has been trained.

## 2023-12-26 ENCOUNTER — ANESTHESIA EVENT (OUTPATIENT)
Dept: OPERATING ROOM | Facility: HOSPITAL | Age: 58
End: 2023-12-26
Payer: COMMERCIAL

## 2023-12-26 RX ORDER — SODIUM CHLORIDE, SODIUM LACTATE, POTASSIUM CHLORIDE, CALCIUM CHLORIDE 600; 310; 30; 20 MG/100ML; MG/100ML; MG/100ML; MG/100ML
100 INJECTION, SOLUTION INTRAVENOUS CONTINUOUS
Status: CANCELLED | OUTPATIENT
Start: 2023-12-26

## 2023-12-26 RX ORDER — OXYCODONE HYDROCHLORIDE 5 MG/1
5 TABLET ORAL EVERY 4 HOURS PRN
Status: CANCELLED | OUTPATIENT
Start: 2023-12-26

## 2023-12-26 NOTE — H&P
"History Of Present Illness  Adonay Stephens is a 58 y.o. male presenting with left knee pain.     Past Medical History  He has a past medical history of Body mass index (BMI) 33.0-33.9, adult (08/23/2021), Encounter for general adult medical examination without abnormal findings (08/26/2020), Encounter for immunization (10/29/2020), Joint pain, Other obesity due to excess calories (08/23/2021), Personal history of other diseases of the nervous system and sense organs (02/22/2022), and Unspecified disorder of ear, unspecified ear (02/22/2022).    Surgical History  He has a past surgical history that includes Other surgical history (12/01/2020); Other surgical history (12/01/2020); and Knee arthroscopy w/ meniscectomy (Right).     Social History  He reports that he has never smoked. His smokeless tobacco use includes snuff. He reports current alcohol use. He reports that he does not use drugs.    Family History  Family History   Problem Relation Name Age of Onset    Heart attack Father          Allergies  Patient has no known allergies.    Review of Systems  Negative     Physical Exam  Positive Joselito's     Last Recorded Vitals  Height 1.88 m (6' 2\"), weight 113 kg (250 lb).    Relevant Results      Scheduled medications    Continuous medications    PRN medications    No results found for this or any previous visit (from the past 24 hour(s)).    Assessment/Plan   Principal Problem:    Other tear of medial meniscus, current injury, left knee, initial encounter      Discussed arthroscopic partial meniscectomy, patient agreeable.       I spent 10 minutes in the professional and overall care of this patient.      Rashi Rock PA-C    " Referring Provider (Optional): Mumtaz

## 2023-12-26 NOTE — ANESTHESIA PREPROCEDURE EVALUATION
Patient: Adonay Stephens    Procedure Information       Date/Time: 12/27/23 0800    Procedure: Arthroscopy Knee Medial Meniscectomy (Left: Knee)    Location: [facility]    Surgeons: Julian Wilson MD            Relevant Problems   Endocrine   (+) Class 1 obesity due to excess calories with serious comorbidity and body mass index (BMI) of 30.0 to 30.9 in adult      Musculoskeletal   (+) Osteoarthritis of knee      Other   (+) Gout of right foot       Clinical information reviewed:   Tobacco  Allergies  Meds   Med Hx  Surg Hx   Fam Hx  Soc Hx        NPO Detail:  No data recorded     Physical Exam    Airway  Mallampati: II     Cardiovascular   Rhythm: regular  Rate: normal     Dental    Pulmonary - normal exam     Abdominal - normal exam             Anesthesia Plan    ASA 2     general     intravenous induction   Postoperative administration of opioids is intended.  Anesthetic plan and risks discussed with patient.

## 2023-12-27 ENCOUNTER — ANESTHESIA (OUTPATIENT)
Dept: OPERATING ROOM | Facility: HOSPITAL | Age: 58
End: 2023-12-27
Payer: COMMERCIAL

## 2023-12-27 ENCOUNTER — HOSPITAL ENCOUNTER (OUTPATIENT)
Facility: HOSPITAL | Age: 58
Setting detail: OUTPATIENT SURGERY
Discharge: HOME | End: 2023-12-27
Attending: ORTHOPAEDIC SURGERY | Admitting: ORTHOPAEDIC SURGERY
Payer: COMMERCIAL

## 2023-12-27 VITALS
BODY MASS INDEX: 31.77 KG/M2 | DIASTOLIC BLOOD PRESSURE: 85 MMHG | TEMPERATURE: 97.2 F | WEIGHT: 247.58 LBS | OXYGEN SATURATION: 97 % | SYSTOLIC BLOOD PRESSURE: 135 MMHG | HEART RATE: 52 BPM | HEIGHT: 74 IN | RESPIRATION RATE: 16 BRPM

## 2023-12-27 DIAGNOSIS — M94.262 CHONDROMALACIA OF KNEE, LEFT: ICD-10-CM

## 2023-12-27 DIAGNOSIS — G89.18 POST-OPERATIVE PAIN: Primary | ICD-10-CM

## 2023-12-27 PROCEDURE — 7100000010 HC PHASE TWO TIME - EACH INCREMENTAL 1 MINUTE: Performed by: ORTHOPAEDIC SURGERY

## 2023-12-27 PROCEDURE — 2500000004 HC RX 250 GENERAL PHARMACY W/ HCPCS (ALT 636 FOR OP/ED): Performed by: ANESTHESIOLOGY

## 2023-12-27 PROCEDURE — 2500000004 HC RX 250 GENERAL PHARMACY W/ HCPCS (ALT 636 FOR OP/ED): Performed by: STUDENT IN AN ORGANIZED HEALTH CARE EDUCATION/TRAINING PROGRAM

## 2023-12-27 PROCEDURE — 3600000009 HC OR TIME - EACH INCREMENTAL 1 MINUTE - PROCEDURE LEVEL FOUR: Performed by: ORTHOPAEDIC SURGERY

## 2023-12-27 PROCEDURE — 2500000004 HC RX 250 GENERAL PHARMACY W/ HCPCS (ALT 636 FOR OP/ED): Performed by: ORTHOPAEDIC SURGERY

## 2023-12-27 PROCEDURE — 3700000002 HC GENERAL ANESTHESIA TIME - EACH INCREMENTAL 1 MINUTE: Performed by: ORTHOPAEDIC SURGERY

## 2023-12-27 PROCEDURE — 3700000001 HC GENERAL ANESTHESIA TIME - INITIAL BASE CHARGE: Performed by: ORTHOPAEDIC SURGERY

## 2023-12-27 PROCEDURE — 2500000005 HC RX 250 GENERAL PHARMACY W/O HCPCS: Performed by: ANESTHESIOLOGY

## 2023-12-27 PROCEDURE — 2500000005 HC RX 250 GENERAL PHARMACY W/O HCPCS: Performed by: ORTHOPAEDIC SURGERY

## 2023-12-27 PROCEDURE — 3600000004 HC OR TIME - INITIAL BASE CHARGE - PROCEDURE LEVEL FOUR: Performed by: ORTHOPAEDIC SURGERY

## 2023-12-27 PROCEDURE — 7100000009 HC PHASE TWO TIME - INITIAL BASE CHARGE: Performed by: ORTHOPAEDIC SURGERY

## 2023-12-27 PROCEDURE — 7100000001 HC RECOVERY ROOM TIME - INITIAL BASE CHARGE: Performed by: ORTHOPAEDIC SURGERY

## 2023-12-27 PROCEDURE — A4217 STERILE WATER/SALINE, 500 ML: HCPCS | Performed by: ORTHOPAEDIC SURGERY

## 2023-12-27 PROCEDURE — 2720000007 HC OR 272 NO HCPCS: Performed by: ORTHOPAEDIC SURGERY

## 2023-12-27 PROCEDURE — 29877 ARTHRS KNEE SURG DBRDMT/SHVG: CPT | Performed by: ORTHOPAEDIC SURGERY

## 2023-12-27 PROCEDURE — 7100000002 HC RECOVERY ROOM TIME - EACH INCREMENTAL 1 MINUTE: Performed by: ORTHOPAEDIC SURGERY

## 2023-12-27 RX ORDER — DROPERIDOL 2.5 MG/ML
0.62 INJECTION, SOLUTION INTRAMUSCULAR; INTRAVENOUS ONCE AS NEEDED
Status: DISCONTINUED | OUTPATIENT
Start: 2023-12-27 | End: 2023-12-27 | Stop reason: HOSPADM

## 2023-12-27 RX ORDER — HYDROMORPHONE HYDROCHLORIDE 1 MG/ML
INJECTION, SOLUTION INTRAMUSCULAR; INTRAVENOUS; SUBCUTANEOUS AS NEEDED
Status: DISCONTINUED | OUTPATIENT
Start: 2023-12-27 | End: 2023-12-27

## 2023-12-27 RX ORDER — SODIUM CHLORIDE 0.9 G/100ML
IRRIGANT IRRIGATION AS NEEDED
Status: DISCONTINUED | OUTPATIENT
Start: 2023-12-27 | End: 2023-12-27 | Stop reason: HOSPADM

## 2023-12-27 RX ORDER — KETOROLAC TROMETHAMINE 30 MG/ML
INJECTION, SOLUTION INTRAMUSCULAR; INTRAVENOUS AS NEEDED
Status: DISCONTINUED | OUTPATIENT
Start: 2023-12-27 | End: 2023-12-27

## 2023-12-27 RX ORDER — SODIUM CHLORIDE, SODIUM LACTATE, POTASSIUM CHLORIDE, CALCIUM CHLORIDE 600; 310; 30; 20 MG/100ML; MG/100ML; MG/100ML; MG/100ML
100 INJECTION, SOLUTION INTRAVENOUS CONTINUOUS
Status: DISCONTINUED | OUTPATIENT
Start: 2023-12-27 | End: 2023-12-27 | Stop reason: HOSPADM

## 2023-12-27 RX ORDER — MIDAZOLAM HYDROCHLORIDE 1 MG/ML
INJECTION, SOLUTION INTRAMUSCULAR; INTRAVENOUS AS NEEDED
Status: DISCONTINUED | OUTPATIENT
Start: 2023-12-27 | End: 2023-12-27

## 2023-12-27 RX ORDER — LIDOCAINE HYDROCHLORIDE 20 MG/ML
INJECTION, SOLUTION INFILTRATION; PERINEURAL AS NEEDED
Status: DISCONTINUED | OUTPATIENT
Start: 2023-12-27 | End: 2023-12-27

## 2023-12-27 RX ORDER — CEFAZOLIN SODIUM 2 G/100ML
2 INJECTION, SOLUTION INTRAVENOUS ONCE
Status: COMPLETED | OUTPATIENT
Start: 2023-12-27 | End: 2023-12-27

## 2023-12-27 RX ORDER — DIPHENHYDRAMINE HYDROCHLORIDE 50 MG/ML
12.5 INJECTION INTRAMUSCULAR; INTRAVENOUS ONCE AS NEEDED
Status: DISCONTINUED | OUTPATIENT
Start: 2023-12-27 | End: 2023-12-27 | Stop reason: HOSPADM

## 2023-12-27 RX ORDER — PROPOFOL 10 MG/ML
INJECTION, EMULSION INTRAVENOUS AS NEEDED
Status: DISCONTINUED | OUTPATIENT
Start: 2023-12-27 | End: 2023-12-27

## 2023-12-27 RX ORDER — ACETAMINOPHEN 325 MG/1
TABLET ORAL AS NEEDED
Status: DISCONTINUED | OUTPATIENT
Start: 2023-12-27 | End: 2023-12-27

## 2023-12-27 RX ORDER — LIDOCAINE HYDROCHLORIDE 10 MG/ML
0.1 INJECTION, SOLUTION EPIDURAL; INFILTRATION; INTRACAUDAL; PERINEURAL ONCE
Status: DISCONTINUED | OUTPATIENT
Start: 2023-12-27 | End: 2023-12-27 | Stop reason: HOSPADM

## 2023-12-27 RX ORDER — GLYCOPYRROLATE 0.2 MG/ML
INJECTION INTRAMUSCULAR; INTRAVENOUS AS NEEDED
Status: DISCONTINUED | OUTPATIENT
Start: 2023-12-27 | End: 2023-12-27

## 2023-12-27 RX ORDER — ASPIRIN 81 MG/1
81 TABLET ORAL 2 TIMES DAILY
Qty: 28 TABLET | Refills: 0 | Status: SHIPPED | OUTPATIENT
Start: 2023-12-27 | End: 2024-01-10

## 2023-12-27 RX ORDER — MEPERIDINE HYDROCHLORIDE 25 MG/ML
12.5 INJECTION INTRAMUSCULAR; INTRAVENOUS; SUBCUTANEOUS EVERY 10 MIN PRN
Status: DISCONTINUED | OUTPATIENT
Start: 2023-12-27 | End: 2023-12-27 | Stop reason: HOSPADM

## 2023-12-27 RX ORDER — ONDANSETRON HYDROCHLORIDE 2 MG/ML
INJECTION, SOLUTION INTRAVENOUS AS NEEDED
Status: DISCONTINUED | OUTPATIENT
Start: 2023-12-27 | End: 2023-12-27

## 2023-12-27 RX ORDER — DEXAMETHASONE SODIUM PHOSPHATE 4 MG/ML
INJECTION, SOLUTION INTRA-ARTICULAR; INTRALESIONAL; INTRAMUSCULAR; INTRAVENOUS; SOFT TISSUE AS NEEDED
Status: DISCONTINUED | OUTPATIENT
Start: 2023-12-27 | End: 2023-12-27

## 2023-12-27 RX ORDER — HYDROCODONE BITARTRATE AND ACETAMINOPHEN 5; 325 MG/1; MG/1
1 TABLET ORAL EVERY 6 HOURS PRN
Qty: 12 TABLET | Refills: 0 | Status: SHIPPED | OUTPATIENT
Start: 2023-12-27 | End: 2024-01-01

## 2023-12-27 RX ORDER — BUPIVACAINE HYDROCHLORIDE 5 MG/ML
INJECTION, SOLUTION PERINEURAL AS NEEDED
Status: DISCONTINUED | OUTPATIENT
Start: 2023-12-27 | End: 2023-12-27 | Stop reason: HOSPADM

## 2023-12-27 RX ORDER — FENTANYL CITRATE 50 UG/ML
50 INJECTION, SOLUTION INTRAMUSCULAR; INTRAVENOUS EVERY 5 MIN PRN
Status: DISCONTINUED | OUTPATIENT
Start: 2023-12-27 | End: 2023-12-27 | Stop reason: HOSPADM

## 2023-12-27 RX ORDER — LABETALOL HYDROCHLORIDE 5 MG/ML
5 INJECTION, SOLUTION INTRAVENOUS ONCE AS NEEDED
Status: DISCONTINUED | OUTPATIENT
Start: 2023-12-27 | End: 2023-12-27 | Stop reason: HOSPADM

## 2023-12-27 RX ADMIN — KETOROLAC TROMETHAMINE 30 MG: 30 INJECTION, SOLUTION INTRAMUSCULAR at 07:57

## 2023-12-27 RX ADMIN — HYDROMORPHONE HYDROCHLORIDE 1 MG: 1 INJECTION, SOLUTION INTRAMUSCULAR; INTRAVENOUS; SUBCUTANEOUS at 08:13

## 2023-12-27 RX ADMIN — ACETAMINOPHEN 975 MG: 325 TABLET ORAL at 07:45

## 2023-12-27 RX ADMIN — DEXAMETHASONE SODIUM PHOSPHATE 8 MG: 4 INJECTION, SOLUTION INTRAMUSCULAR; INTRAVENOUS at 08:04

## 2023-12-27 RX ADMIN — ONDANSETRON 4 MG: 2 INJECTION, SOLUTION INTRAMUSCULAR; INTRAVENOUS at 07:57

## 2023-12-27 RX ADMIN — GLYCOPYRROLATE 0.2 MG: 0.2 INJECTION, SOLUTION INTRAMUSCULAR; INTRAVENOUS at 07:57

## 2023-12-27 RX ADMIN — MIDAZOLAM 2 MG: 1 INJECTION INTRAMUSCULAR; INTRAVENOUS at 07:57

## 2023-12-27 RX ADMIN — SODIUM CHLORIDE, POTASSIUM CHLORIDE, SODIUM LACTATE AND CALCIUM CHLORIDE: 600; 310; 30; 20 INJECTION, SOLUTION INTRAVENOUS at 07:50

## 2023-12-27 RX ADMIN — FENTANYL CITRATE 50 MCG: 50 INJECTION, SOLUTION INTRAMUSCULAR; INTRAVENOUS at 08:56

## 2023-12-27 RX ADMIN — SODIUM CHLORIDE, POTASSIUM CHLORIDE, SODIUM LACTATE AND CALCIUM CHLORIDE 100 ML/HR: 600; 310; 30; 20 INJECTION, SOLUTION INTRAVENOUS at 06:46

## 2023-12-27 RX ADMIN — PROPOFOL 200 MG: 10 INJECTION, EMULSION INTRAVENOUS at 08:04

## 2023-12-27 RX ADMIN — FENTANYL CITRATE 50 MCG: 50 INJECTION, SOLUTION INTRAMUSCULAR; INTRAVENOUS at 09:02

## 2023-12-27 RX ADMIN — CEFAZOLIN SODIUM 2 G: 2 INJECTION, SOLUTION INTRAVENOUS at 08:10

## 2023-12-27 RX ADMIN — LIDOCAINE HYDROCHLORIDE 40 MG: 20 INJECTION, SOLUTION INFILTRATION; PERINEURAL at 08:04

## 2023-12-27 ASSESSMENT — COLUMBIA-SUICIDE SEVERITY RATING SCALE - C-SSRS
1. IN THE PAST MONTH, HAVE YOU WISHED YOU WERE DEAD OR WISHED YOU COULD GO TO SLEEP AND NOT WAKE UP?: NO
2. HAVE YOU ACTUALLY HAD ANY THOUGHTS OF KILLING YOURSELF?: NO
6. HAVE YOU EVER DONE ANYTHING, STARTED TO DO ANYTHING, OR PREPARED TO DO ANYTHING TO END YOUR LIFE?: NO

## 2023-12-27 ASSESSMENT — PAIN SCALES - GENERAL
PAINLEVEL_OUTOF10: 0 - NO PAIN
PAINLEVEL_OUTOF10: 4
PAINLEVEL_OUTOF10: 5 - MODERATE PAIN
PAINLEVEL_OUTOF10: 0 - NO PAIN
PAINLEVEL_OUTOF10: 5 - MODERATE PAIN
PAINLEVEL_OUTOF10: 5 - MODERATE PAIN

## 2023-12-27 ASSESSMENT — PAIN - FUNCTIONAL ASSESSMENT
PAIN_FUNCTIONAL_ASSESSMENT: 0-10

## 2023-12-27 NOTE — OP NOTE
Operative Note     Date: 2023  OR Location: [facility]    Name: Adonay Stephens : 1965, Age: 58 y.o., MRN: [id number], Sex: male    Diagnosis  Pre-op Diagnosis     * Other tear of medial meniscus, current injury, left knee, initial encounter [S83.242A] Post-op Diagnosis     * Chondromalacia of knee, left [M94.262]     Procedures  Left knee arthroscopy, chondroplasty patella    Surgeons      * Julian Wilson - Primary    Resident/Fellow/Other Assistant:  Surgeon(s) and Role:     * Rashi Rock PA-C - Assisting    Procedure Summary  Anesthesia: General  ASA: II  Anesthesia Staff: Anesthesiologist: Kulwinder Hanson DO  CRNA: CARLOS Rojo-CRNA  Estimated Blood Loss: 5 mL  Intra-op Medications:   Medication Name Total Dose   sodium chloride 0.9 % irrigation solution 3,000 mL   BUPivacaine HCl (Marcaine) 0.5 % (5 mg/mL) injection 25 mL   ceFAZolin in dextrose (iso-os) (Ancef) IVPB 2 g 2 g              Anesthesia Record               Intraprocedure I/O Totals       None           Specimen: No specimens collected     Staff:   Circulator: Jenfier Fonseca RN  Scrub Person: Ana Huizar           Implants:     Findings:   Operative findings: (Outerbridge classification 0-4)   Patella: 4  Trochlea: 2-3  Medial compartment: 1  Lateral compartment: 1-2    Indications:     The patient was seen in the preoperative area. The risks, benefits, complications, treatment options, non-operative alternatives, expected recovery and outcomes were discussed with the patient. The possibilities of reaction to medication, pulmonary aspiration, injury to surrounding structures, bleeding, recurrent infection, the need for additional procedures, failure to diagnose a condition, and creating a complication requiring transfusion or operation were discussed with the patient. The patient concurred with the proposed plan, giving informed consent.  The site of surgery was properly noted/marked if necessary per policy. The  patient has been actively warmed in preoperative area. Preoperative antibiotics have been ordered and given within 1 hours of incision. Venous thrombosis prophylaxis have been ordered.     Patient was noted to have internal derangement of the knee refractory to non-surgical modalities.  We discussed associated interventions and the risks of the surgery, including DVT/PE, infection, stiffness, medical complications, and incomplete relief of pre-operative symptoms.    Procedure Details:   The patient was met prior to surgery and the appropriate extremity was marked.  We reviewed recent health history and found no contraindication to proceeding with the planned procedure.  The patient was transported to the operating room and underwent general anesthesia.  The patient was positioned supine on the operative table with all kyaw prominences well-padded. A sequential compression device was placed on the contralateral leg.  A non-sterile thigh tourniquet was placed and a padded lateral thigh post.    The leg was prepped and draped in the usual sterile fashion.  A timeout was completed and antibiotic administration was in accordance with standard protocol.  The leg was exsanguinated using an Esmarch bandage and the tourniquet was inflated.  The superficial landmarks of the knee were palpated and anteromedial and anterolateral portals were created.    A diagnostic arthroscopy was performed utilizing a fluid pump with sterile saline.  The articular surface of the patella, trochlea, medial and lateral femoral condyles and tibial plateaus were evaluated.  The Outerbridge classifications are listed above.  The gutters were evaluated and no loose bodies were noted. The medial compartment was entered with valgus stress in a slightly flexed knee against the lateral post.  The assistant helped with this to facilitate visualization.  The meniscus was probed superiorly and inferiorly using a blunt probe.  The notch was inspected and  the ACL and PCL were healthy in appearance and had appropriate tension when probed.  The knee was then flexed into a figure of four position and the lateral compartment was entered.      The articular surface of the patella and trochlea had chondral wear and a component of delamination which we felt could contribute to mechanical symptoms.  A 3.5 mm aggressive plus shaver was used to debride the articular cartilage until stable margins were obtained.    The medial meniscus was noted to have some mild degenerative fraying of the intermargin but was felt to be clinically insignificant and did not require any significant meniscectomy.    The knee was irrigated and lavaged to remove and loose meniscal or chondral debris.  A second pass was made diagnostically to confirm removal of any fragments and inspect for any additional pathology.  The residual fluid was expressed from the knee.  The portals were closed using a buried 3-0 monocryl suture.  Local anesthetic was injected into the soft tissue around the portals. Sterile bandages were placed.  The patient was stable to the recovery room.    I was present and participated in the entire procedure. The Physician Assistant participated in all critical elements of the procedure under my direct supervision. The surgical incision was closed by the PA under my indirect supervision. There were no qualified residents available to assist.    The physician assistant was present for the entire case.  Given the nature of the procedure and disease process, a skilled surgical assistant was necessary for the case.  The assistant was necessary for retraction and helped directly facilitate completion of the surgery.  A certified scrub tech was at the back table managing instruments and supplies for the surgical procedure.      Complications:  None; patient tolerated the procedure well.    Disposition: PACU - hemodynamically stable.  Condition: stable         Julian Wilson  Phone Number:  [phone]

## 2023-12-27 NOTE — H&P
History and physical is reviewed, patient re evaluated, no changes.  Procedure, risks, benefits, and postoperative course were discussed with the patient and consent is reviewed.    Julian Wilson MD

## 2023-12-27 NOTE — ANESTHESIA POSTPROCEDURE EVALUATION
Patient: Adonay Stephens    Procedure Summary       Date: 12/27/23 Room / Location: [facility] OR 05 / [facility]    Anesthesia Start: 0800 Anesthesia Stop: 0843    Procedure: Arthroscopy Knee Medial Meniscectomy (Left: Knee) Diagnosis:       Chondromalacia of knee, left      (Other tear of medial meniscus, current injury, left knee, initial encounter [S83.242A])    Surgeons: Julian Wilson MD Responsible Provider: Kulwinder Hanson DO    Anesthesia Type: general ASA Status: 2            Anesthesia Type: general    Vitals Value Taken Time   /64 12/27/23 0843   Temp 36.1 12/27/23 0843   Pulse 64 12/27/23 0842   Resp 10 12/27/23 0842   SpO2 95 % 12/27/23 0842   Vitals shown include unvalidated device data.    Anesthesia Post Evaluation    Patient location during evaluation: PACU  Patient participation: complete - patient cannot participate  Level of consciousness: responsive to physical stimuli  Pain management: adequate  Airway patency: fixed obstruction  Cardiovascular status: acceptable, blood pressure returned to baseline and hemodynamically stable  Respiratory status: nonlabored ventilation, spontaneous ventilation, oral airway, face mask and unassisted  Hydration status: acceptable  Postoperative Nausea and Vomiting: none      There were no known notable events for this encounter.

## 2023-12-27 NOTE — ANESTHESIA PROCEDURE NOTES
Airway  Date/Time: 12/27/2023 8:08 AM  Urgency: elective    Airway not difficult    Staffing  Performed: CRNA   Authorized by: Kulwinder Hanson DO    Performed by: CARLOS Rojo-STEPHON  Patient location during procedure: OR    Indications and Patient Condition  Indications for airway management: anesthesia and airway protection  Spontaneous Ventilation: absent  Sedation level: deep  Preoxygenated: yes  Mask difficulty assessment: 0 - not attempted    Final Airway Details  Final airway type: supraglottic airway      Successful airway: classic  Size 5     Number of attempts at approach: 1

## 2023-12-27 NOTE — DISCHARGE INSTRUCTIONS
Post-Operative Instructions - Basic Knee Arthroscopy    Dr. Julian Wilson    Activity / Swelling:  Okay to weightbear as tolerated immediately.  A brace is NOT needed. Crutches are used for balance and support but are only needed until patient feels safe ambulating.  ICE PACK to assist with pain control   Packs should not be in direct contact with your skin  Use fairly continuously until you remove the post-op dressing  After dressing removed, use for up to 20 minutes every hour as needed for pain and swelling     Diet:  Gradually resume your normal diet as tolerated following surgery.  The evening of your surgical day, begin with liquids and/or light foods.  If you are feeling well enough the next morning, progress to your normal eating patterns    Dressing care /Showering / Hygiene:  Keep operative dressing clean, dry, and intact.     Remove dressing on Post-Op Day 3    Leave the Steri-strips (small white tapes across the incisions) in place.  If no Steri-strips or they come off with dressing cover incisions with a regular / large band aid.  Do not apply lotions or ointments to incisions.  Observe the incision sites for increased redness, drainage, or foul odor.     Showering:  Once the dressing has been removed, you may shower. Incisions may be gently cleansed with mild soap. Do not scrub or rub incisions. No baths, hot-tubs, pools, or immersive soaking of any kind until sutures are removed and incisions are healed.      Medications:  Pain:  You will be given a prescription for pain medication after your surgery.  It should be taken as needed only and in many cases is NOT needed.  The goal is to discontinue it as quickly as possible.  DO NOT drive or operate heavy machinery while taking this medication as it will make you drowsy.  Do not take any additional pain medications.  This medication often continues Tylenol / acetaminophen and NO additional acetaminophen should be taken.      You may want to consider  also taking over-the-counter stool softener and/or laxative (Colace, Senekot or Dulcolax). These medications should be taken as directed and only short term.    In addition to pain medication recommend over the counter Ibuprofen 600 mg every 6-8 hours.  Take with food and avoid if any issues with stomach/GI or kidneys.  Can also add over the counter medicine (Pepcid/omeprazole) to help protect the stomach.  Do NOT take any additional anti-inflammatories.  Do not take the ibuprofen if prior bleeding issues or history of ulcers.     Prevention of Blood Clots:  81 mg of aspirin (over the counter) to start the day after surgery for 2 weeks.   Calf pumps should be done at rest.  If you have a history of blood clots you may receive a different prescription (for example: lovenox, xarelto, eliquis)     Do NOT take if prescribed other blood thinners which will be discussed prior.    Physical therapy:  Will be discussed at first follow-up appointment but can begin immediately if scheduled, if no PT is scheduled then home therapy can begin the day after surgery ~ 4 times a day for 20 min:  prop up the heel and working on gently pushing down on thigh to promote a STRAIGHT LEG.    Quad sets: with leg straight tighten quad muscles and hold for 5 seconds.  Bending can be limited by swelling but okay to bend the leg immediately and as much as tolerated.    Driving: once off narcotics, off crutches, and good leg control is achieved.  Will be discussed at first visit.    Follow-up:         Generally 10-14 days post-op      Call with any concerns, especially calf pain, signs of infection (fever, drainage) or shortness of breath.  [phone]    General Anesthesia Discharge Instructions    About this topic  You may need general anesthesia if you need to be asleep during a procedure. Your doctor will use drugs to block the signals that go from your nerves to your brain. Doctors give general anesthesia during a surgery or procedure  to:  Allow you to sleep  Help your body be still  Relax your muscles  Help you to relax and be pain free  Keep you from remembering the surgery  Let the doctor manage your airway, breathing, and blood flow  The doctor or nurse anesthetist gives general anesthesia by a shot into your vein. Sometimes, you may breathe in a gas through a mask placed over your face.  What care is needed at home?  Ask your doctor what you need to do when you go home. Make sure you ask questions if you do not understand what the doctor says.  Your doctor may give you drugs to prevent or treat an upset stomach from the anesthetic. Take them as ordered.  If your throat is sore, suck on ice chips or popsicles to ease throat pain.  Put 2 to 3 pillows under your head and back when you lie down to help you breathe easier.  For the first 24 to 48 hours:  Do not operate heavy or dangerous machinery.  Do not make major decisions or sign important papers. You may not be able to think clearly.  Avoid beer, wine, or mixed drinks.  You are at a higher risk of falling for at least 24 hours after general anesthesia.  Take extra care when you get up.  Do not change positions quickly.  Do not rush when you need to go to the bathroom or to answer the phone.  Ask for help if you feel unsteady when you try to walk.  Wear shoes with non-slip soles and low heels.  What follow-up care is needed?  Your doctor may ask you to come back to the office to check on your progress. Be sure to keep these visits.  If you have stitches that do not dissolve or staples, you will need to have them removed. Your doctor will want to do this in 1 to 2 weeks. If the doctor used skin glue, the glue will fall off on its own.  What drugs may be needed?  The doctor may order drugs to:  Help with pain  Treat an upset stomach or throwing up  Will physical activity be limited?  You will not be allowed to drive right away after the procedure. Ask a family member or a friend to drive you  home.  Avoid trying to get out of bed without help until you are sure of your balance.  You may have to limit your activity. Talk to your doctor about if you need to limit how much you lift or limit exercise after your procedure.  What changes to diet are needed?  Start with a light diet when you are fully awake. This includes things that are easy to swallow like soups, pudding, jello, toast, and eggs. Slowly progress to your normal diet.  What problems could happen?  Low blood pressure  Breathing problems  Upset stomach or throwing up  Dizziness  Blood clots  Infection  When do I need to call the doctor?  Trouble breathing  Upset stomach or throwing up more than 3 times in the next 2 days  Dizziness  Teach Back: Helping You Understand  The Teach Back Method helps you understand the information we are giving you. After you talk with the staff, tell them in your own words what you learned. This helps to make sure the staff has described each thing clearly. It also helps to explain things that may have been confusing. Before going home, make sure you can do these:  I can tell you about my procedure.  I can tell you if I need to follow up with my doctor.  I can tell you what is good for me to eat and drink the next day.  I can tell you what I would do if I have trouble breathing, an upset stomach, or dizziness.  Where can I learn more?  [employer]  https://www.nigms.nih.gov/education/pages/factsheet_Anesthesia.aspx  N[facility] Choices  http://www.nhs.uk/conditions/Anaesthetic-general/Pages/Definition.aspx  Last Reviewed Date  2020-04-22

## 2024-01-09 ENCOUNTER — OFFICE VISIT (OUTPATIENT)
Dept: ORTHOPEDIC SURGERY | Facility: CLINIC | Age: 59
End: 2024-01-09
Payer: COMMERCIAL

## 2024-01-09 DIAGNOSIS — M17.9 OSTEOARTHRITIS OF KNEE: Primary | ICD-10-CM

## 2024-01-09 PROCEDURE — 99024 POSTOP FOLLOW-UP VISIT: CPT | Performed by: ORTHOPAEDIC SURGERY

## 2024-01-09 NOTE — PROGRESS NOTES
History of Present Illness:  Patient returns today noting minimal pain.  Denies any calf pain or shortness of breath.  States that the left knee is doing much better, however the right knee is significantly declining, states that the steroid injection is already worn off that he received on 12/19/2023.  He is inquiring about options for further imaging on the right knee as he is worried about recurrent meniscal pathology.    Physical Examination:   Left knee   Mild effusion  Healthy incisions - no active drainage  Good range of motion  No calf swelling  Negative Amanda´s test  Distal neurovascular exam intact    Operative Findings:  Patella: 4  Trochlea: 2-3  Medial compartment: 1  Lateral compartment: 1-2    Assessment:  Patient status post left knee arthroscopy with chondroplasty of the patella and trochlea and lateral tibial plateau    Plan:  Reviewed arthroscopic photos and findings.  Discussed short and long term implications for the knee.  Discussed analgesics, ice, rest.  Encouraged home exercise program, physical therapy.   We discussed with the patient we feel the left knee is doing well, would not necessarily recommend advanced imaging for the right knee at this point given his previous operative findings of grade 4 changes of the patella and trochlea as well as grade 2-3 changes medially.  Recommended treating the knee is osteoarthritis to be show this is his primary concern.  Recommended viscosupplementation following corticosteroid injections as needed.  Additionally discussed options for arthroplasty.    Rashi Rock PA-C     In a face to face encounter, I evaluated the patient and performed a physical examination, discussed pertinent diagnostic studies if indicated and discussed diagnosis and management strategies with both the patient and physician assistant / nurse practitioner.  I reviewed the PA/NP's note and agree with the documented findings and plan of care.    Patient with moderate to  severe osteoarthritis bilaterally.  Doing well on his left side status post arthroscopy.    Right knee with no effusion but tenderness over medial joint line.    Discussed that based on his persistent pain in the right knee and failure to improve with a corticosteroid injection last month we will consider viscosupplementation but do feel he may be trending towards total knee arthroplasty.    We will submit for authorization of viscosupplementation injection for right knee.    Patient has a diagnosis of knee osteoarthritis supported by radiological evidence.  There is partially preserved joint space in the patellofemoral, medial and lateral compartments of the knee on weight-bearing AP x-rays within the past two years.  Pain interferes with functional activities.  Patient has failed to respond to 6 months of treatments including activity modification, home exercise, protective weightbearing and physical therapy.  Patient has failed to respond adequately to at least 6 months trials two analgesics.  Patient has failed to respond to an adequate trial of intra-articular steroid injection with less than six weeks of pain improvement.          Julian Wilson MD

## 2024-01-23 ENCOUNTER — OFFICE VISIT (OUTPATIENT)
Dept: ORTHOPEDIC SURGERY | Facility: CLINIC | Age: 59
End: 2024-01-23
Payer: COMMERCIAL

## 2024-01-23 DIAGNOSIS — M25.569 KNEE PAIN, UNSPECIFIED CHRONICITY, UNSPECIFIED LATERALITY: Primary | ICD-10-CM

## 2024-01-23 PROCEDURE — 20610 DRAIN/INJ JOINT/BURSA W/O US: CPT | Performed by: ORTHOPAEDIC SURGERY

## 2024-01-23 PROCEDURE — 2500000004 HC RX 250 GENERAL PHARMACY W/ HCPCS (ALT 636 FOR OP/ED): Mod: JZ | Performed by: ORTHOPAEDIC SURGERY

## 2024-01-23 RX ADMIN — Medication 16.8 MG: at 14:29

## 2024-01-23 NOTE — PROGRESS NOTES
History of Present Illness:  Patient returns today for an injection with viscosupplementation. The patient endorsing knee pain refractory to cortisone injections and oral medications    Review of Systems   GENERAL: Negative for malaise, significant weight loss, fever  MUSCULOSKELETAL: see HPI  NEURO:  Negative    Physical Examination:  Trace effusion  Tenderness over medial and lateral joint line    Assessment:  Patient with known osteoarthritis of the knee    Plan:  Injection performed as detailed in the procedure note below.     L Inj/Asp: R knee on 1/23/2024 2:29 PM  Indications: pain  Details: 22 G needle, anteromedial approach  Medications: 16.8 mg sodium hyaluronate 16.8 mg/2 mL  Outcome: tolerated well, no immediate complications  Procedure, treatment alternatives, risks and benefits explained, specific risks discussed. Consent was given by the patient. Immediately prior to procedure a time out was called to verify the correct patient, procedure, equipment, support staff and site/side marked as required. Patient was prepped and draped in the usual sterile fashion.

## 2024-01-31 ENCOUNTER — OFFICE VISIT (OUTPATIENT)
Dept: ORTHOPEDIC SURGERY | Facility: CLINIC | Age: 59
End: 2024-01-31
Payer: COMMERCIAL

## 2024-01-31 DIAGNOSIS — M17.11 PRIMARY OSTEOARTHRITIS OF RIGHT KNEE: Primary | ICD-10-CM

## 2024-01-31 PROCEDURE — 99024 POSTOP FOLLOW-UP VISIT: CPT | Performed by: ORTHOPAEDIC SURGERY

## 2024-01-31 PROCEDURE — 20610 DRAIN/INJ JOINT/BURSA W/O US: CPT | Performed by: ORTHOPAEDIC SURGERY

## 2024-01-31 ASSESSMENT — PAIN - FUNCTIONAL ASSESSMENT: PAIN_FUNCTIONAL_ASSESSMENT: 0-10

## 2024-01-31 ASSESSMENT — PAIN SCALES - GENERAL: PAINLEVEL_OUTOF10: 6

## 2024-01-31 NOTE — PROGRESS NOTES
History of Present Illness:  Patient returns today for an injection with viscosupplementation. The patient endorsing knee pain refractory to cortisone injections and oral medications    Review of Systems   GENERAL: Negative for malaise, significant weight loss, fever  MUSCULOSKELETAL: see HPI  NEURO:  Negative    Physical Examination:  Trace effusion  Tenderness over medial and lateral joint line    Assessment:  Patient with known osteoarthritis of the knee    Plan:  Injection performed as detailed in the procedure note below.     L Inj/Asp: R knee on 1/31/2024 2:43 PM  Indications: pain  Details: 22 G needle, anteromedial approach  Outcome: tolerated well, no immediate complications  Procedure, treatment alternatives, risks and benefits explained, specific risks discussed. Consent was given by the patient. Immediately prior to procedure a time out was called to verify the correct patient, procedure, equipment, support staff and site/side marked as required. Patient was prepped and draped in the usual sterile fashion.

## 2024-02-07 ENCOUNTER — APPOINTMENT (OUTPATIENT)
Dept: ORTHOPEDIC SURGERY | Facility: CLINIC | Age: 59
End: 2024-02-07
Payer: COMMERCIAL

## 2024-02-13 ENCOUNTER — OFFICE VISIT (OUTPATIENT)
Dept: ORTHOPEDIC SURGERY | Facility: CLINIC | Age: 59
End: 2024-02-13
Payer: COMMERCIAL

## 2024-02-13 DIAGNOSIS — M25.569 KNEE PAIN, UNSPECIFIED CHRONICITY, UNSPECIFIED LATERALITY: Primary | ICD-10-CM

## 2024-02-13 PROCEDURE — 99024 POSTOP FOLLOW-UP VISIT: CPT | Performed by: ORTHOPAEDIC SURGERY

## 2024-02-13 PROCEDURE — 20610 DRAIN/INJ JOINT/BURSA W/O US: CPT | Mod: RT | Performed by: ORTHOPAEDIC SURGERY

## 2024-02-13 PROCEDURE — 20610 DRAIN/INJ JOINT/BURSA W/O US: CPT | Performed by: ORTHOPAEDIC SURGERY

## 2024-02-13 PROCEDURE — 2500000004 HC RX 250 GENERAL PHARMACY W/ HCPCS (ALT 636 FOR OP/ED): Mod: JZ | Performed by: ORTHOPAEDIC SURGERY

## 2024-02-13 RX ADMIN — Medication 16.8 MG: at 15:04

## 2024-02-13 NOTE — PROGRESS NOTES
History of Present Illness:  Patient returns today for an injection with viscosupplementation. The patient endorsing knee pain refractory to cortisone injections and oral medications    Review of Systems   GENERAL: Negative for malaise, significant weight loss, fever  MUSCULOSKELETAL: see HPI  NEURO:  Negative    Physical Examination:  Trace effusion  Tenderness over medial and lateral joint line    Assessment:  Patient with known osteoarthritis of the knee    Plan:  Injection performed as detailed in the procedure note below.     L Inj/Asp: R knee on 2/13/2024 3:04 PM  Indications: pain  Details: 22 G needle, anteromedial approach  Medications: 16.8 mg sodium hyaluronate 16.8 mg/2 mL  Outcome: tolerated well, no immediate complications  Procedure, treatment alternatives, risks and benefits explained, specific risks discussed. Consent was given by the patient. Immediately prior to procedure a time out was called to verify the correct patient, procedure, equipment, support staff and site/side marked as required. Patient was prepped and draped in the usual sterile fashion.         Patient still having some pain in his knee discussed the possibility of incomplete relief with the [facility] MRI may help but it may just push him towards arthroplasty.

## 2024-03-26 ENCOUNTER — OFFICE VISIT (OUTPATIENT)
Dept: ORTHOPEDIC SURGERY | Facility: CLINIC | Age: 59
End: 2024-03-26
Payer: COMMERCIAL

## 2024-03-26 DIAGNOSIS — M23.91 INTERNAL DERANGEMENT OF RIGHT KNEE: ICD-10-CM

## 2024-03-26 DIAGNOSIS — M17.11 PRIMARY OSTEOARTHRITIS OF RIGHT KNEE: ICD-10-CM

## 2024-03-26 PROCEDURE — 99213 OFFICE O/P EST LOW 20 MIN: CPT | Performed by: ORTHOPAEDIC SURGERY

## 2024-03-26 PROCEDURE — 99214 OFFICE O/P EST MOD 30 MIN: CPT | Performed by: ORTHOPAEDIC SURGERY

## 2024-03-26 RX ORDER — CYCLOBENZAPRINE HCL 10 MG
10 TABLET ORAL 3 TIMES DAILY PRN
Qty: 30 TABLET | Refills: 0 | Status: SHIPPED | OUTPATIENT
Start: 2024-03-26 | End: 2024-04-05

## 2024-03-26 RX ORDER — MELOXICAM 15 MG/1
15 TABLET ORAL
Qty: 30 TABLET | Refills: 2 | Status: SHIPPED | OUTPATIENT
Start: 2024-03-26

## 2024-03-26 ASSESSMENT — PAIN - FUNCTIONAL ASSESSMENT: PAIN_FUNCTIONAL_ASSESSMENT: 0-10

## 2024-03-26 ASSESSMENT — PAIN SCALES - GENERAL: PAINLEVEL_OUTOF10: 8

## 2024-03-26 NOTE — PROGRESS NOTES
History of Present Illness:   Patient returns today with known bilateral patellofemoral arthritis.  He had a left knee arthroscopy with some relief now increasing right knee pain over the past few weeks with some mechanical symptoms.  Has tried viscosupplementation and cortisone with no relief.    Review of Systems   GENERAL: Negative for malaise, significant weight loss, fever  MUSCULOSKELETAL: see HPI  NEURO:  Negative    Physical Examination:  Right Knee:  Skin healthy and intact  No gross swelling or ecchymosis  No significant varus or valgus malalignment  Effusion: none    ROM:  Full flexion   Full extension  No pain with internal rotation of the hip  Tenderness to palpation:   Medial and lateral joint line    No laxity to valgus stress  No laxity to varus stress  Negative Lachman´s test  Negative anterior drawer test  Negative posterior drawer test  Positive Joselito´s test    Neurovascular exam normal distally      Imaging:  MRI reviewed from 2021    Assessment:   Patient with bilateral knee patellofemoral arthritis with increasing right knee pain and some mechanical symptoms    Plan:  Reviewed with the patient the differential diagnosis discussed possibility of insufficiency fracture, meniscal pathology or worsening arthritis.  Discussed that based on failure to improve recommend MRI for further evaluation.    A nonsteroidal anti-inflammatory drug (NSAID) was prescribed.  We reviewed the risks (including gastric issues, renal issues) and benefits of the medication.  We discussed a scheduled course if no adverse reactions to help with swelling / pain.  We discussed that chronic usage should be checked with primary care physician.

## 2024-04-13 ENCOUNTER — HOSPITAL ENCOUNTER (OUTPATIENT)
Dept: RADIOLOGY | Facility: HOSPITAL | Age: 59
Discharge: HOME | End: 2024-04-13
Payer: COMMERCIAL

## 2024-04-13 DIAGNOSIS — M23.91 INTERNAL DERANGEMENT OF RIGHT KNEE: ICD-10-CM

## 2024-04-13 DIAGNOSIS — M17.11 PRIMARY OSTEOARTHRITIS OF RIGHT KNEE: ICD-10-CM

## 2024-04-13 PROCEDURE — 73721 MRI JNT OF LWR EXTRE W/O DYE: CPT | Mod: RT

## 2024-04-13 PROCEDURE — 73721 MRI JNT OF LWR EXTRE W/O DYE: CPT | Mod: RIGHT SIDE | Performed by: RADIOLOGY

## 2024-04-23 ENCOUNTER — OFFICE VISIT (OUTPATIENT)
Dept: ORTHOPEDIC SURGERY | Facility: CLINIC | Age: 59
End: 2024-04-23
Payer: COMMERCIAL

## 2024-04-23 DIAGNOSIS — M25.569 KNEE PAIN, UNSPECIFIED CHRONICITY, UNSPECIFIED LATERALITY: Primary | ICD-10-CM

## 2024-04-23 PROCEDURE — 99214 OFFICE O/P EST MOD 30 MIN: CPT | Performed by: ORTHOPAEDIC SURGERY

## 2024-04-23 PROCEDURE — 20610 DRAIN/INJ JOINT/BURSA W/O US: CPT | Performed by: ORTHOPAEDIC SURGERY

## 2024-04-23 PROCEDURE — 2500000005 HC RX 250 GENERAL PHARMACY W/O HCPCS: Performed by: ORTHOPAEDIC SURGERY

## 2024-04-23 PROCEDURE — 99213 OFFICE O/P EST LOW 20 MIN: CPT | Performed by: ORTHOPAEDIC SURGERY

## 2024-04-23 PROCEDURE — 2500000004 HC RX 250 GENERAL PHARMACY W/ HCPCS (ALT 636 FOR OP/ED): Performed by: ORTHOPAEDIC SURGERY

## 2024-04-23 RX ORDER — TRIAMCINOLONE ACETONIDE 40 MG/ML
40 INJECTION, SUSPENSION INTRA-ARTICULAR; INTRAMUSCULAR
Status: COMPLETED | OUTPATIENT
Start: 2024-04-23 | End: 2024-04-23

## 2024-04-23 RX ORDER — LIDOCAINE HYDROCHLORIDE 10 MG/ML
2 INJECTION INFILTRATION; PERINEURAL
Status: COMPLETED | OUTPATIENT
Start: 2024-04-23 | End: 2024-04-23

## 2024-04-23 RX ADMIN — LIDOCAINE HYDROCHLORIDE 2 ML: 10 INJECTION, SOLUTION INFILTRATION; PERINEURAL at 10:19

## 2024-04-23 RX ADMIN — TRIAMCINOLONE ACETONIDE 40 MG: 40 INJECTION, SUSPENSION INTRA-ARTICULAR; INTRAMUSCULAR at 10:19

## 2024-04-23 NOTE — PROGRESS NOTES
History of Present Illness:  Patient with known osteoarthritis of the bilateral knee who presents today for repeat evaluation.  The patient notes persistent pain as well as some occasional mechanical symptoms.  The patient has tried the following modalities: Rest ice Visco with no relief.    Here today to review the MRI of his right knee    Review of Systems:   GENERAL: Negative for malaise, significant weight loss, fever  MUSCULOSKELETAL: see HPI  NEURO:  Negative    Physical Examination:  Right Knee:  Skin healthy and intact  No gross swelling or ecchymosis  Alignment: Neutral  Effusion: Trace  ROM: Full  Crepitance with range of motion  No pain with internal rotation of the hip  Tenderness to palpation: over medial and lateral joint line and with patellar compression     No laxity to valgus stress  No laxity to varus stress  Negative Lachman´s test  Negative posterior drawer test  Mild pain with Joselito´s test    Neurovascular exam normal distally  2+ DP pulse and good cap refill    Imaging:  Reviewed, degenerative joint disease noted, severe patellofemoral    Assessment:  Patient with known osteoarthritis of the right knee    Plan:  We reviewed an evidence-based approach to OA of the knee.  We discussed past treatments as well options for future treatment.  We discussed NSAIDS (risks and benefits), low impact activities.    MRI reviewed discussed that he may be trending towards total knee arthroplasty.  Elected for an injection today but did discuss surgery briefly including press-fit versus conventional robotic etc.    L Inj/Asp: R knee on 4/23/2024 10:19 AM  Indications: pain  Details: 22 G needle, anteromedial approach  Medications: 2 mL lidocaine 10 mg/mL (1 %); 40 mg triamcinolone acetonide 40 mg/mL  Outcome: tolerated well, no immediate complications  Procedure, treatment alternatives, risks and benefits explained, specific risks discussed. Consent was given by the patient. Immediately prior to  procedure a time out was called to verify the correct patient, procedure, equipment, support staff and site/side marked as required. Patient was prepped and draped in the usual sterile fashion.

## 2024-06-29 DIAGNOSIS — M17.11 PRIMARY OSTEOARTHRITIS OF RIGHT KNEE: ICD-10-CM

## 2024-06-29 DIAGNOSIS — M23.91 INTERNAL DERANGEMENT OF RIGHT KNEE: ICD-10-CM

## 2024-07-01 RX ORDER — MELOXICAM 15 MG/1
TABLET ORAL
Qty: 30 TABLET | Refills: 2 | Status: SHIPPED | OUTPATIENT
Start: 2024-07-01

## 2024-07-02 NOTE — PROGRESS NOTES
"Subjective   Patient ID: Adonay Stephens is a 59 y.o. male who presents for Annual Exam and Sinus Problem.    HPI    Annual physical   Eats a generally healthy diet   Staying active   Denies any chest pain, SOB  No Abdominal pain   No black or bloody stools   Urination/BM normal   Last eye apt 5/2024  Last dental apt 2 to 3 yrs  No new family h/o cancers or heart disease    Sinus issues x 3 month   Symptoms include sinus pressure, stuffiness  Pt is taking Advil Cold & Sinus no relief     Denies drainage.       No other concern /question     Review of systems  ; Patient seen today for exam denies any problems with headaches or vision, denies any shortness of breath chest pain nausea or vomiting, no black stool no blood in the stool no heartburn type symptoms denies any problems with constipation or diarrhea, and no dysuria-type symptoms    The patient's allergies medications were reviewed with them today    The patient's social family and surgical history or also reviewed here today, along with her past medical history.     Objective     Alert and active in  no acute distress  HEENT TMs clear oropharynx negative nares clear no drainage noted neck supple  With no adenopathy   Heart regular rate and rhythm without murmur and no carotid bruits  Lungs- clear to auscultation bilaterally, no wheeze or rhonchi noted  Thyroid -negative masses or nodularity  Abdomen- soft times four quadrants , bowel sounds positive no masses or organomegaly, negative tenderness guarding or rebound  Neurological exam unremarkable- DTRs in upper and lower extremities within normal limits.   skin -no lesions noted      /72 (BP Location: Left arm, Patient Position: Sitting, BP Cuff Size: Large adult)   Pulse 54   Temp 36.3 °C (97.3 °F) (Temporal)   Resp 16   Ht 1.88 m (6' 2\")   Wt 114 kg (252 lb)   SpO2 98%   BMI 32.35 kg/m²     No Known Allergies    Assessment/Plan   Problem List Items Addressed This Visit       Borderline " hypertension    Relevant Orders    CBC and Auto Differential    Comprehensive Metabolic Panel    Dyslipidemia    Relevant Orders    Lipid Panel    Erectile dysfunction    Malaise and fatigue    Internal derangement of knee    Osteoarthritis of knee    BMI 32.0-32.9,adult     Other Visit Diagnoses       Routine general medical examination at a health care facility    -  Primary    Prostate cancer screening        Relevant Orders    Prostate Specific Antigen, Screen    Class 1 obesity due to excess calories without serious comorbidity with body mass index (BMI) of 32.0 to 32.9 in adult        Seasonal allergies              Discussed patient's BMI and to institute calorie reduction and increase exercise to decrease risk of diabetes and heart disease in the future.    Refill Flexeril.     Recommend biking to strengthen knees.     Celebrex prescribed today.   Take this medication with food.     Singulair prescribed today.     Labs have been ordered, she/he will have these performed and we will contact her/him with results.  (CBC, CMP, Lipid, PSA)    If anything worsens or changes please call us at once, follow up in the office as planned.    Scribe Attestation  By signing my name below, I, Kelly Vang MA, Scribe   attest that this documentation has been prepared under the direction and in the presence of Teofilo Banerjee DO.

## 2024-07-08 ENCOUNTER — APPOINTMENT (OUTPATIENT)
Dept: PRIMARY CARE | Facility: CLINIC | Age: 59
End: 2024-07-08
Payer: COMMERCIAL

## 2024-07-08 ENCOUNTER — LAB (OUTPATIENT)
Dept: LAB | Facility: LAB | Age: 59
End: 2024-07-08
Payer: COMMERCIAL

## 2024-07-08 VITALS
HEIGHT: 74 IN | RESPIRATION RATE: 16 BRPM | SYSTOLIC BLOOD PRESSURE: 118 MMHG | OXYGEN SATURATION: 98 % | TEMPERATURE: 97.3 F | HEART RATE: 54 BPM | WEIGHT: 252 LBS | BODY MASS INDEX: 32.34 KG/M2 | DIASTOLIC BLOOD PRESSURE: 72 MMHG

## 2024-07-08 DIAGNOSIS — J30.2 SEASONAL ALLERGIES: ICD-10-CM

## 2024-07-08 DIAGNOSIS — M23.91 INTERNAL DERANGEMENT OF RIGHT KNEE: ICD-10-CM

## 2024-07-08 DIAGNOSIS — Z12.5 PROSTATE CANCER SCREENING: ICD-10-CM

## 2024-07-08 DIAGNOSIS — N52.9 ERECTILE DYSFUNCTION, UNSPECIFIED ERECTILE DYSFUNCTION TYPE: ICD-10-CM

## 2024-07-08 DIAGNOSIS — R03.0 BORDERLINE HYPERTENSION: ICD-10-CM

## 2024-07-08 DIAGNOSIS — M17.9 OSTEOARTHRITIS OF KNEE, UNSPECIFIED LATERALITY, UNSPECIFIED OSTEOARTHRITIS TYPE: ICD-10-CM

## 2024-07-08 DIAGNOSIS — E78.5 DYSLIPIDEMIA: ICD-10-CM

## 2024-07-08 DIAGNOSIS — R53.81 MALAISE AND FATIGUE: ICD-10-CM

## 2024-07-08 DIAGNOSIS — E66.09 CLASS 1 OBESITY DUE TO EXCESS CALORIES WITHOUT SERIOUS COMORBIDITY WITH BODY MASS INDEX (BMI) OF 32.0 TO 32.9 IN ADULT: ICD-10-CM

## 2024-07-08 DIAGNOSIS — Z00.00 ROUTINE GENERAL MEDICAL EXAMINATION AT A HEALTH CARE FACILITY: Primary | ICD-10-CM

## 2024-07-08 DIAGNOSIS — M17.11 PRIMARY OSTEOARTHRITIS OF RIGHT KNEE: ICD-10-CM

## 2024-07-08 DIAGNOSIS — R53.83 MALAISE AND FATIGUE: ICD-10-CM

## 2024-07-08 LAB
ALBUMIN SERPL BCP-MCNC: 4.4 G/DL (ref 3.4–5)
ALP SERPL-CCNC: 51 U/L (ref 33–120)
ALT SERPL W P-5'-P-CCNC: 20 U/L (ref 10–52)
ANION GAP SERPL CALC-SCNC: 11 MMOL/L (ref 10–20)
AST SERPL W P-5'-P-CCNC: 25 U/L (ref 9–39)
BASOPHILS # BLD AUTO: 0.02 X10*3/UL (ref 0–0.1)
BASOPHILS NFR BLD AUTO: 0.5 %
BILIRUB SERPL-MCNC: 1.2 MG/DL (ref 0–1.2)
BUN SERPL-MCNC: 27 MG/DL (ref 6–23)
CALCIUM SERPL-MCNC: 9.5 MG/DL (ref 8.6–10.3)
CHLORIDE SERPL-SCNC: 108 MMOL/L (ref 98–107)
CHOLEST SERPL-MCNC: 232 MG/DL (ref 0–199)
CHOLESTEROL/HDL RATIO: 4.9
CO2 SERPL-SCNC: 27 MMOL/L (ref 21–32)
CREAT SERPL-MCNC: 1.28 MG/DL (ref 0.5–1.3)
EGFRCR SERPLBLD CKD-EPI 2021: 65 ML/MIN/1.73M*2
EOSINOPHIL # BLD AUTO: 0.06 X10*3/UL (ref 0–0.7)
EOSINOPHIL NFR BLD AUTO: 1.4 %
ERYTHROCYTE [DISTWIDTH] IN BLOOD BY AUTOMATED COUNT: 13.9 % (ref 11.5–14.5)
GLUCOSE SERPL-MCNC: 95 MG/DL (ref 74–99)
HCT VFR BLD AUTO: 39.6 % (ref 41–52)
HDLC SERPL-MCNC: 47.2 MG/DL
HGB BLD-MCNC: 13.1 G/DL (ref 13.5–17.5)
IMM GRANULOCYTES # BLD AUTO: 0.01 X10*3/UL (ref 0–0.7)
IMM GRANULOCYTES NFR BLD AUTO: 0.2 % (ref 0–0.9)
LDLC SERPL CALC-MCNC: 161 MG/DL
LYMPHOCYTES # BLD AUTO: 1.82 X10*3/UL (ref 1.2–4.8)
LYMPHOCYTES NFR BLD AUTO: 42.6 %
MCH RBC QN AUTO: 32.9 PG (ref 26–34)
MCHC RBC AUTO-ENTMCNC: 33.1 G/DL (ref 32–36)
MCV RBC AUTO: 100 FL (ref 80–100)
MONOCYTES # BLD AUTO: 0.54 X10*3/UL (ref 0.1–1)
MONOCYTES NFR BLD AUTO: 12.6 %
NEUTROPHILS # BLD AUTO: 1.82 X10*3/UL (ref 1.2–7.7)
NEUTROPHILS NFR BLD AUTO: 42.7 %
NON HDL CHOLESTEROL: 185 MG/DL (ref 0–149)
NRBC BLD-RTO: 0 /100 WBCS (ref 0–0)
PLATELET # BLD AUTO: 167 X10*3/UL (ref 150–450)
POTASSIUM SERPL-SCNC: 4 MMOL/L (ref 3.5–5.3)
PROT SERPL-MCNC: 7.3 G/DL (ref 6.4–8.2)
PSA SERPL-MCNC: 3.58 NG/ML
RBC # BLD AUTO: 3.98 X10*6/UL (ref 4.5–5.9)
SODIUM SERPL-SCNC: 142 MMOL/L (ref 136–145)
TRIGL SERPL-MCNC: 117 MG/DL (ref 0–149)
VLDL: 23 MG/DL (ref 0–40)
WBC # BLD AUTO: 4.3 X10*3/UL (ref 4.4–11.3)

## 2024-07-08 PROCEDURE — 3008F BODY MASS INDEX DOCD: CPT | Performed by: FAMILY MEDICINE

## 2024-07-08 PROCEDURE — 99396 PREV VISIT EST AGE 40-64: CPT | Performed by: FAMILY MEDICINE

## 2024-07-08 PROCEDURE — 80061 LIPID PANEL: CPT

## 2024-07-08 PROCEDURE — 36415 COLL VENOUS BLD VENIPUNCTURE: CPT

## 2024-07-08 PROCEDURE — 80053 COMPREHEN METABOLIC PANEL: CPT

## 2024-07-08 PROCEDURE — 84153 ASSAY OF PSA TOTAL: CPT

## 2024-07-08 PROCEDURE — 85025 COMPLETE CBC W/AUTO DIFF WBC: CPT

## 2024-07-08 RX ORDER — CELECOXIB 200 MG/1
200 CAPSULE ORAL DAILY
Qty: 30 CAPSULE | Refills: 2 | Status: SHIPPED | OUTPATIENT
Start: 2024-07-08 | End: 2025-01-04

## 2024-07-08 RX ORDER — MONTELUKAST SODIUM 10 MG/1
10 TABLET ORAL NIGHTLY
Qty: 90 TABLET | Refills: 0 | Status: SHIPPED | OUTPATIENT
Start: 2024-07-08

## 2024-07-08 RX ORDER — CYCLOBENZAPRINE HCL 10 MG
10 TABLET ORAL 3 TIMES DAILY PRN
Qty: 30 TABLET | Refills: 5 | Status: SHIPPED | OUTPATIENT
Start: 2024-07-08 | End: 2024-08-17

## 2024-07-08 RX ORDER — MONTELUKAST SODIUM 10 MG/1
10 TABLET ORAL NIGHTLY
Qty: 30 TABLET | Refills: 2 | Status: SHIPPED | OUTPATIENT
Start: 2024-07-08 | End: 2024-07-08

## 2024-07-08 ASSESSMENT — PATIENT HEALTH QUESTIONNAIRE - PHQ9
1. LITTLE INTEREST OR PLEASURE IN DOING THINGS: NOT AT ALL
SUM OF ALL RESPONSES TO PHQ9 QUESTIONS 1 AND 2: 0
2. FEELING DOWN, DEPRESSED OR HOPELESS: NOT AT ALL

## 2024-07-10 ENCOUNTER — TELEPHONE (OUTPATIENT)
Dept: PRIMARY CARE | Facility: CLINIC | Age: 59
End: 2024-07-10
Payer: COMMERCIAL

## 2024-07-10 DIAGNOSIS — E78.00 ELEVATED CHOLESTEROL: ICD-10-CM

## 2024-07-10 RX ORDER — ROSUVASTATIN CALCIUM 10 MG/1
10 TABLET, COATED ORAL DAILY
Qty: 30 TABLET | Refills: 2 | Status: SHIPPED | OUTPATIENT
Start: 2024-07-10

## 2024-07-10 NOTE — TELEPHONE ENCOUNTER
----- Message from Teofilo Banerjee sent at 7/10/2024  1:37 PM EDT -----   cholesterol is too high, also remind him he should should not take meloxicam and Celebrex together we would recommend a low-dose cholesterol pill Crestor 10 mg 1 each day #30 with 2 refills recheck his blood test in 4 to 6 months

## 2024-07-23 ENCOUNTER — OFFICE VISIT (OUTPATIENT)
Dept: ORTHOPEDIC SURGERY | Facility: CLINIC | Age: 59
End: 2024-07-23
Payer: COMMERCIAL

## 2024-07-23 DIAGNOSIS — M25.569 KNEE PAIN, UNSPECIFIED CHRONICITY, UNSPECIFIED LATERALITY: Primary | ICD-10-CM

## 2024-07-23 PROCEDURE — 2500000005 HC RX 250 GENERAL PHARMACY W/O HCPCS: Performed by: ORTHOPAEDIC SURGERY

## 2024-07-23 PROCEDURE — 99214 OFFICE O/P EST MOD 30 MIN: CPT | Performed by: ORTHOPAEDIC SURGERY

## 2024-07-23 PROCEDURE — 2500000004 HC RX 250 GENERAL PHARMACY W/ HCPCS (ALT 636 FOR OP/ED): Performed by: ORTHOPAEDIC SURGERY

## 2024-07-23 PROCEDURE — 99213 OFFICE O/P EST LOW 20 MIN: CPT | Mod: 57 | Performed by: ORTHOPAEDIC SURGERY

## 2024-07-23 PROCEDURE — 20610 DRAIN/INJ JOINT/BURSA W/O US: CPT | Mod: 50 | Performed by: ORTHOPAEDIC SURGERY

## 2024-07-23 RX ORDER — TRIAMCINOLONE ACETONIDE 40 MG/ML
40 INJECTION, SUSPENSION INTRA-ARTICULAR; INTRAMUSCULAR
Status: COMPLETED | OUTPATIENT
Start: 2024-07-23 | End: 2024-07-23

## 2024-07-23 RX ORDER — LIDOCAINE HYDROCHLORIDE 10 MG/ML
2 INJECTION INFILTRATION; PERINEURAL
Status: COMPLETED | OUTPATIENT
Start: 2024-07-23 | End: 2024-07-23

## 2024-07-23 NOTE — PROGRESS NOTES
History of Present Illness:  Patient with known osteoarthritis of the knee who presents today for repeat evaluation.  The patient notes persistent pain as well as some occasional mechanical symptoms.  The patient has tried the following modalities: rest, ice, visco, cs.    Some relief with cortisone but like to discuss more definitive solutions.    Review of Systems:   GENERAL: Negative for malaise, significant weight loss, fever  MUSCULOSKELETAL: see HPI  NEURO:  Negative    Physical Examination:  Bilateral Knee:  Skin healthy and intact  No gross swelling or ecchymosis  Alignment: Neutral  Effusion: Trace  ROM:  full  Crepitance with range of motion  No pain with internal rotation of the hip  Tenderness to palpation: over medial and lateral joint line and with patellar compression     No laxity to valgus stress  No laxity to varus stress  Negative Lachman´s test  Negative posterior drawer test  Mild pain with Joselito´s test    Neurovascular exam normal distally  2+ DP pulse and good cap refill    Imaging:  Reviewed, degenerative joint disease noted severe patellofemoral    Assessment:  Patient with known osteoarthritis of the bilateral knee    Plan:  We reviewed an evidence-based approach to OA of the knee.  We discussed past treatments as well options for future treatment.  We discussed NSAIDS (risks and benefits), low impact activities.    We discussed that he is likely trending towards total knee arthroplasty reviewed the procedure risks, benefits and anticipated recovery with him.  He has some trips coming up this fall and likely discuss after that.  We reviewed a variety of considerations for him in regards to planning and postoperative care.    L Inj/Asp: bilateral knee on 7/23/2024 9:18 AM  Indications: pain  Details: 22 G needle, anteromedial approach  Medications (Right): 2 mL lidocaine 10 mg/mL (1 %); 40 mg triamcinolone acetonide 40 mg/mL  Medications (Left): 2 mL lidocaine 10 mg/mL (1 %); 40 mg  triamcinolone acetonide 40 mg/mL  Outcome: tolerated well, no immediate complications  Procedure, treatment alternatives, risks and benefits explained, specific risks discussed. Consent was given by the patient. Immediately prior to procedure a time out was called to verify the correct patient, procedure, equipment, support staff and site/side marked as required. Patient was prepped and draped in the usual sterile fashion.

## 2024-10-03 DIAGNOSIS — M17.9 OSTEOARTHRITIS OF KNEE, UNSPECIFIED LATERALITY, UNSPECIFIED OSTEOARTHRITIS TYPE: ICD-10-CM

## 2024-10-03 DIAGNOSIS — E78.00 ELEVATED CHOLESTEROL: ICD-10-CM

## 2024-10-03 RX ORDER — CELECOXIB 200 MG/1
200 CAPSULE ORAL DAILY
Qty: 30 CAPSULE | Refills: 2 | Status: SHIPPED | OUTPATIENT
Start: 2024-10-03

## 2024-10-03 RX ORDER — ROSUVASTATIN CALCIUM 10 MG/1
10 TABLET, COATED ORAL DAILY
Qty: 30 TABLET | Refills: 2 | Status: SHIPPED | OUTPATIENT
Start: 2024-10-03

## 2024-10-24 ENCOUNTER — HOSPITAL ENCOUNTER (OUTPATIENT)
Facility: HOSPITAL | Age: 59
Setting detail: OUTPATIENT SURGERY
End: 2024-10-24
Attending: ORTHOPAEDIC SURGERY | Admitting: ORTHOPAEDIC SURGERY
Payer: COMMERCIAL

## 2024-10-24 ENCOUNTER — APPOINTMENT (OUTPATIENT)
Dept: ORTHOPEDIC SURGERY | Facility: CLINIC | Age: 59
End: 2024-10-24
Payer: COMMERCIAL

## 2024-10-24 DIAGNOSIS — S83.242A TEAR OF MEDIAL MENISCUS OF LEFT KNEE, CURRENT, UNSPECIFIED TEAR TYPE, INITIAL ENCOUNTER: Primary | ICD-10-CM

## 2024-10-24 PROBLEM — M17.11 UNILATERAL PRIMARY OSTEOARTHRITIS, RIGHT KNEE: Status: ACTIVE | Noted: 2024-10-24

## 2024-10-24 PROCEDURE — 99214 OFFICE O/P EST MOD 30 MIN: CPT | Performed by: ORTHOPAEDIC SURGERY

## 2024-10-24 PROCEDURE — 20610 DRAIN/INJ JOINT/BURSA W/O US: CPT | Performed by: ORTHOPAEDIC SURGERY

## 2024-10-24 RX ORDER — TRIAMCINOLONE ACETONIDE 40 MG/ML
40 INJECTION, SUSPENSION INTRA-ARTICULAR; INTRAMUSCULAR
Status: COMPLETED | OUTPATIENT
Start: 2024-10-24 | End: 2024-10-24

## 2024-10-24 RX ORDER — LIDOCAINE HYDROCHLORIDE 10 MG/ML
2 INJECTION, SOLUTION INFILTRATION; PERINEURAL
Status: COMPLETED | OUTPATIENT
Start: 2024-10-24 | End: 2024-10-24

## 2024-10-24 NOTE — PROGRESS NOTES
History of Present Illness:  Patient with known osteoarthritis of the knee who presents today for repeat evaluation.  The patient notes persistent pain as well as some occasional mechanical symptoms.  The patient has tried the following modalities: Rest ice, anti-inflammatories.    He is traveling to [place] next month.    Review of Systems:   GENERAL: Negative for malaise, significant weight loss, fever  MUSCULOSKELETAL: see HPI  NEURO:  Negative    Physical Examination:  Bilateral Knee:  Skin healthy and intact  No gross swelling or ecchymosis  Effusion: Trace  ROM:  slight decrease  Crepitance with range of motion  No pain with internal rotation of the hip  Tenderness to palpation: over medial and lateral joint line and with patellar compression     No laxity to valgus stress  No laxity to varus stress  Negative Lachman´s test  Negative posterior drawer test  Mild pain with Joselito´s test    Neurovascular exam normal distally  2+ DP pulse and good cap refill    Imaging:  Reviewed, degenerative joint disease noted     Assessment:  Patient with known osteoarthritis of bilateral knees    Plan:  We reviewed an evidence-based approach to OA of the knee.  We discussed past treatments as well options for future treatment.  We discussed NSAIDS (risks and benefits), low impact activities.    The patient has failed to improve with multiple non-operative modalities.  There is increasing difficulty with activities of daily living and concern for falls.  The patient is endorsing severe pain and disability.      We had a lengthy discussion regarding total knee arthroplasty including the orthopaedic risks, including but not limited to: stiffness, infection, hematoma, early aseptic loosening, neurologic or vascular injury, clicking, difficulty kneeling and incomplete relief of pain.  We reviewed the medical risks, including but not limited to: deep venous thrombosis, pulmonary embolism, and cardiovascular/pulmonary  issues.    We discussed the anticipated longevity of the implants and potential for revision surgery.  We also discussed anticoagulation, rehabilitation goals, and the hospital course.  We discussed the likelihood of opioid analgesics and risks associated with them.    The next step is to obtain medical risk stratification and encouraged the pre-operative information seminar at the hospital.  We are happy to provide assistance and counseling if the patient has any additional concerns.      L Inj/Asp: L knee on 10/24/2024 3:45 PM  Indications: pain  Details: 22 G needle, anteromedial approach  Medications: 2 mL lidocaine 10 mg/mL (1 %); 40 mg triamcinolone acetonide 40 mg/mL  Outcome: tolerated well, no immediate complications  Procedure, treatment alternatives, risks and benefits explained, specific risks discussed. Consent was given by the patient. Immediately prior to procedure a time out was called to verify the correct patient, procedure, equipment, support staff and site/side marked as required. Patient was prepped and draped in the usual sterile fashion.

## 2024-10-25 ENCOUNTER — LAB (OUTPATIENT)
Dept: LAB | Facility: LAB | Age: 59
End: 2024-10-25
Payer: COMMERCIAL

## 2024-10-25 ENCOUNTER — ANCILLARY PROCEDURE (OUTPATIENT)
Dept: CARDIOLOGY | Facility: CLINIC | Age: 59
End: 2024-10-25
Payer: COMMERCIAL

## 2024-10-25 DIAGNOSIS — M17.11 UNILATERAL PRIMARY OSTEOARTHRITIS, RIGHT KNEE: ICD-10-CM

## 2024-10-25 DIAGNOSIS — E78.00 ELEVATED CHOLESTEROL: ICD-10-CM

## 2024-10-25 LAB
ALBUMIN SERPL BCP-MCNC: 4.2 G/DL (ref 3.4–5)
ALP SERPL-CCNC: 44 U/L (ref 33–120)
ALT SERPL W P-5'-P-CCNC: 19 U/L (ref 10–52)
ANION GAP SERPL CALC-SCNC: 11 MMOL/L (ref 10–20)
APTT PPP: 30 SECONDS (ref 27–38)
AST SERPL W P-5'-P-CCNC: 20 U/L (ref 9–39)
BASOPHILS # BLD AUTO: 0.03 X10*3/UL (ref 0–0.1)
BASOPHILS NFR BLD AUTO: 0.6 %
BILIRUB SERPL-MCNC: 0.7 MG/DL (ref 0–1.2)
BUN SERPL-MCNC: 20 MG/DL (ref 6–23)
CALCIUM SERPL-MCNC: 9.2 MG/DL (ref 8.6–10.3)
CHLORIDE SERPL-SCNC: 106 MMOL/L (ref 98–107)
CHOLEST SERPL-MCNC: 175 MG/DL (ref 0–199)
CHOLESTEROL/HDL RATIO: 3.7
CO2 SERPL-SCNC: 26 MMOL/L (ref 21–32)
CREAT SERPL-MCNC: 1.21 MG/DL (ref 0.5–1.3)
EGFRCR SERPLBLD CKD-EPI 2021: 70 ML/MIN/1.73M*2
EOSINOPHIL # BLD AUTO: 0.09 X10*3/UL (ref 0–0.7)
EOSINOPHIL NFR BLD AUTO: 1.9 %
ERYTHROCYTE [DISTWIDTH] IN BLOOD BY AUTOMATED COUNT: 13.2 % (ref 11.5–14.5)
EST. AVERAGE GLUCOSE BLD GHB EST-MCNC: 108 MG/DL
GLUCOSE SERPL-MCNC: 97 MG/DL (ref 74–99)
HBA1C MFR BLD: 5.4 %
HCT VFR BLD AUTO: 41.5 % (ref 41–52)
HDLC SERPL-MCNC: 47.9 MG/DL
HGB BLD-MCNC: 14.2 G/DL (ref 13.5–17.5)
IMM GRANULOCYTES # BLD AUTO: 0.01 X10*3/UL (ref 0–0.7)
IMM GRANULOCYTES NFR BLD AUTO: 0.2 % (ref 0–0.9)
INR PPP: 1 (ref 0.9–1.1)
LDLC SERPL CALC-MCNC: 115 MG/DL
LYMPHOCYTES # BLD AUTO: 1.82 X10*3/UL (ref 1.2–4.8)
LYMPHOCYTES NFR BLD AUTO: 38.8 %
MCH RBC QN AUTO: 33.6 PG (ref 26–34)
MCHC RBC AUTO-ENTMCNC: 34.2 G/DL (ref 32–36)
MCV RBC AUTO: 98 FL (ref 80–100)
MONOCYTES # BLD AUTO: 0.55 X10*3/UL (ref 0.1–1)
MONOCYTES NFR BLD AUTO: 11.7 %
NEUTROPHILS # BLD AUTO: 2.19 X10*3/UL (ref 1.2–7.7)
NEUTROPHILS NFR BLD AUTO: 46.8 %
NON HDL CHOLESTEROL: 127 MG/DL (ref 0–149)
NRBC BLD-RTO: 0 /100 WBCS (ref 0–0)
PLATELET # BLD AUTO: 170 X10*3/UL (ref 150–450)
POTASSIUM SERPL-SCNC: 4.3 MMOL/L (ref 3.5–5.3)
PROT SERPL-MCNC: 7.1 G/DL (ref 6.4–8.2)
PROTHROMBIN TIME: 11 SECONDS (ref 9.8–12.8)
RBC # BLD AUTO: 4.22 X10*6/UL (ref 4.5–5.9)
SODIUM SERPL-SCNC: 139 MMOL/L (ref 136–145)
TRIGL SERPL-MCNC: 63 MG/DL (ref 0–149)
VLDL: 13 MG/DL (ref 0–40)
WBC # BLD AUTO: 4.7 X10*3/UL (ref 4.4–11.3)

## 2024-10-25 PROCEDURE — 85025 COMPLETE CBC W/AUTO DIFF WBC: CPT

## 2024-10-25 PROCEDURE — 36415 COLL VENOUS BLD VENIPUNCTURE: CPT

## 2024-10-25 PROCEDURE — 80061 LIPID PANEL: CPT

## 2024-10-25 PROCEDURE — 83036 HEMOGLOBIN GLYCOSYLATED A1C: CPT

## 2024-10-25 PROCEDURE — 85730 THROMBOPLASTIN TIME PARTIAL: CPT

## 2024-10-25 PROCEDURE — 85610 PROTHROMBIN TIME: CPT

## 2024-10-25 PROCEDURE — 80053 COMPREHEN METABOLIC PANEL: CPT

## 2024-10-29 ENCOUNTER — TELEPHONE (OUTPATIENT)
Dept: PRIMARY CARE | Facility: CLINIC | Age: 59
End: 2024-10-29
Payer: COMMERCIAL

## 2024-10-30 ENCOUNTER — APPOINTMENT (OUTPATIENT)
Dept: PRIMARY CARE | Facility: CLINIC | Age: 59
End: 2024-10-30
Payer: COMMERCIAL

## 2024-10-30 VITALS
RESPIRATION RATE: 16 BRPM | DIASTOLIC BLOOD PRESSURE: 72 MMHG | BODY MASS INDEX: 33.11 KG/M2 | HEART RATE: 67 BPM | WEIGHT: 258 LBS | HEIGHT: 74 IN | SYSTOLIC BLOOD PRESSURE: 122 MMHG | OXYGEN SATURATION: 96 % | TEMPERATURE: 97.9 F

## 2024-10-30 DIAGNOSIS — E66.09 CLASS 1 OBESITY DUE TO EXCESS CALORIES WITHOUT SERIOUS COMORBIDITY WITH BODY MASS INDEX (BMI) OF 33.0 TO 33.9 IN ADULT: ICD-10-CM

## 2024-10-30 DIAGNOSIS — M17.11 PRIMARY OSTEOARTHRITIS OF RIGHT KNEE: ICD-10-CM

## 2024-10-30 DIAGNOSIS — Z01.818 PREOP EXAMINATION: ICD-10-CM

## 2024-10-30 DIAGNOSIS — E78.5 DYSLIPIDEMIA: ICD-10-CM

## 2024-10-30 DIAGNOSIS — Z23 NEED FOR INFLUENZA VACCINATION: ICD-10-CM

## 2024-10-30 DIAGNOSIS — R03.0 BORDERLINE HYPERTENSION: Primary | ICD-10-CM

## 2024-10-30 DIAGNOSIS — E66.811 CLASS 1 OBESITY DUE TO EXCESS CALORIES WITHOUT SERIOUS COMORBIDITY WITH BODY MASS INDEX (BMI) OF 33.0 TO 33.9 IN ADULT: ICD-10-CM

## 2024-10-30 PROCEDURE — 99214 OFFICE O/P EST MOD 30 MIN: CPT | Performed by: FAMILY MEDICINE

## 2024-10-30 PROCEDURE — 90656 IIV3 VACC NO PRSV 0.5 ML IM: CPT | Performed by: FAMILY MEDICINE

## 2024-10-30 PROCEDURE — 90471 IMMUNIZATION ADMIN: CPT | Performed by: FAMILY MEDICINE

## 2024-10-30 PROCEDURE — 3008F BODY MASS INDEX DOCD: CPT | Performed by: FAMILY MEDICINE

## 2024-10-30 PROCEDURE — 87081 CULTURE SCREEN ONLY: CPT

## 2024-10-30 ASSESSMENT — PATIENT HEALTH QUESTIONNAIRE - PHQ9
2. FEELING DOWN, DEPRESSED OR HOPELESS: NOT AT ALL
1. LITTLE INTEREST OR PLEASURE IN DOING THINGS: NOT AT ALL
SUM OF ALL RESPONSES TO PHQ9 QUESTIONS 1 AND 2: 0
2. FEELING DOWN, DEPRESSED OR HOPELESS: NOT AT ALL
1. LITTLE INTEREST OR PLEASURE IN DOING THINGS: NOT AT ALL
SUM OF ALL RESPONSES TO PHQ9 QUESTIONS 1 AND 2: 0

## 2024-10-31 ENCOUNTER — APPOINTMENT (OUTPATIENT)
Dept: CARDIOLOGY | Facility: CLINIC | Age: 59
End: 2024-10-31
Payer: COMMERCIAL

## 2024-10-31 VITALS — HEART RATE: 55 BPM

## 2024-10-31 DIAGNOSIS — M25.569 KNEE PAIN, UNSPECIFIED CHRONICITY, UNSPECIFIED LATERALITY: Primary | ICD-10-CM

## 2024-10-31 PROCEDURE — 93000 ELECTROCARDIOGRAM COMPLETE: CPT | Performed by: INTERNAL MEDICINE

## 2024-11-01 ENCOUNTER — TELEPHONE (OUTPATIENT)
Dept: PRIMARY CARE | Facility: CLINIC | Age: 59
End: 2024-11-01
Payer: COMMERCIAL

## 2024-11-01 LAB — STAPHYLOCOCCUS SPEC CULT: NORMAL

## 2024-11-13 ENCOUNTER — TELEPHONE (OUTPATIENT)
Dept: ORTHOPEDIC SURGERY | Facility: CLINIC | Age: 59
End: 2024-11-13
Payer: COMMERCIAL

## 2024-11-15 ENCOUNTER — TELEPHONE (OUTPATIENT)
Dept: ORTHOPEDIC SURGERY | Facility: CLINIC | Age: 59
End: 2024-11-15
Payer: COMMERCIAL

## 2024-11-15 ENCOUNTER — APPOINTMENT (OUTPATIENT)
Dept: ORTHOPEDIC SURGERY | Facility: CLINIC | Age: 59
End: 2024-11-15
Payer: COMMERCIAL

## 2024-11-15 DIAGNOSIS — M17.11 PRIMARY OSTEOARTHRITIS OF RIGHT KNEE: ICD-10-CM

## 2024-11-15 DIAGNOSIS — S83.242A TEAR OF MEDIAL MENISCUS OF LEFT KNEE, CURRENT, UNSPECIFIED TEAR TYPE, INITIAL ENCOUNTER: ICD-10-CM

## 2024-11-15 NOTE — TELEPHONE ENCOUNTER
11/15/24  LVM for pt re [facility] care unit that he inquired about through Dr. Katie Austin's .  Asked him to contact DME office.

## 2024-11-18 ENCOUNTER — CLINICAL SUPPORT (OUTPATIENT)
Dept: ORTHOPEDIC SURGERY | Facility: CLINIC | Age: 59
End: 2024-11-18
Payer: COMMERCIAL

## 2024-11-18 ENCOUNTER — HOSPITAL ENCOUNTER (OUTPATIENT)
Dept: RADIOLOGY | Facility: HOSPITAL | Age: 59
Discharge: HOME | End: 2024-11-18
Payer: COMMERCIAL

## 2024-11-18 ENCOUNTER — APPOINTMENT (OUTPATIENT)
Dept: RADIOLOGY | Facility: HOSPITAL | Age: 59
End: 2024-11-18
Payer: COMMERCIAL

## 2024-11-18 DIAGNOSIS — M17.11 UNILATERAL PRIMARY OSTEOARTHRITIS, RIGHT KNEE: ICD-10-CM

## 2024-11-18 PROCEDURE — 73700 CT LOWER EXTREMITY W/O DYE: CPT | Mod: RT

## 2024-11-18 PROCEDURE — 73700 CT LOWER EXTREMITY W/O DYE: CPT | Mod: RIGHT SIDE | Performed by: STUDENT IN AN ORGANIZED HEALTH CARE EDUCATION/TRAINING PROGRAM

## 2024-11-26 DIAGNOSIS — G89.18 POST-OP PAIN: Primary | ICD-10-CM

## 2024-11-26 RX ORDER — DOCUSATE SODIUM 100 MG/1
100 CAPSULE, LIQUID FILLED ORAL 2 TIMES DAILY
Qty: 60 CAPSULE | Refills: 0 | Status: SHIPPED | OUTPATIENT
Start: 2024-11-26

## 2024-11-26 RX ORDER — ONDANSETRON 4 MG/1
4 TABLET, FILM COATED ORAL EVERY 8 HOURS PRN
Qty: 20 TABLET | Refills: 0 | Status: SHIPPED | OUTPATIENT
Start: 2024-11-26 | End: 2024-12-03

## 2024-11-26 RX ORDER — CYCLOBENZAPRINE HCL 10 MG
10 TABLET ORAL 3 TIMES DAILY PRN
Qty: 30 TABLET | Refills: 0 | Status: SHIPPED | OUTPATIENT
Start: 2024-11-26 | End: 2024-12-06

## 2024-11-26 RX ORDER — OXYCODONE AND ACETAMINOPHEN 5; 325 MG/1; MG/1
1 TABLET ORAL EVERY 6 HOURS PRN
Qty: 28 TABLET | Refills: 0 | Status: SHIPPED | OUTPATIENT
Start: 2024-11-26 | End: 2024-12-03

## 2024-11-27 PROCEDURE — 27447 TOTAL KNEE ARTHROPLASTY: CPT | Performed by: ORTHOPAEDIC SURGERY

## 2024-11-27 PROCEDURE — 20985 CPTR-ASST DIR MS PX: CPT | Performed by: ORTHOPAEDIC SURGERY

## 2024-11-27 PROCEDURE — 27447 TOTAL KNEE ARTHROPLASTY: CPT | Performed by: STUDENT IN AN ORGANIZED HEALTH CARE EDUCATION/TRAINING PROGRAM

## 2024-12-05 DIAGNOSIS — G89.18 POST-OP PAIN: Primary | ICD-10-CM

## 2024-12-05 RX ORDER — OXYCODONE AND ACETAMINOPHEN 5; 325 MG/1; MG/1
1 TABLET ORAL EVERY 6 HOURS PRN
Qty: 28 TABLET | Refills: 0 | Status: SHIPPED | OUTPATIENT
Start: 2024-12-05 | End: 2024-12-12

## 2024-12-12 ENCOUNTER — TELEPHONE (OUTPATIENT)
Dept: PHYSICAL THERAPY | Facility: HOSPITAL | Age: 59
End: 2024-12-12
Payer: COMMERCIAL

## 2024-12-12 NOTE — TELEPHONE ENCOUNTER
Returned patients call and LVM for him to call back and schedule POST OP EVAL for the 12/16 at 1:45 p.m.

## 2024-12-13 ENCOUNTER — TELEPHONE (OUTPATIENT)
Dept: PHYSICAL THERAPY | Facility: HOSPITAL | Age: 59
End: 2024-12-13
Payer: COMMERCIAL

## 2024-12-13 NOTE — TELEPHONE ENCOUNTER
PC TO PT TO ENSURE THAT HE WAS AWARE OF NEW CHECK IN TIME; NO DOCUMENTATION TO REFLECT CALL MADE TO PT RE: NEW APPT TIME. APPT CONFIRMED

## 2024-12-16 ENCOUNTER — EVALUATION (OUTPATIENT)
Dept: PHYSICAL THERAPY | Facility: HOSPITAL | Age: 59
End: 2024-12-16
Payer: COMMERCIAL

## 2024-12-16 ENCOUNTER — HOSPITAL ENCOUNTER (OUTPATIENT)
Dept: RADIOLOGY | Facility: HOSPITAL | Age: 59
Discharge: HOME | End: 2024-12-16
Payer: COMMERCIAL

## 2024-12-16 ENCOUNTER — APPOINTMENT (OUTPATIENT)
Dept: ORTHOPEDIC SURGERY | Facility: CLINIC | Age: 59
End: 2024-12-16
Payer: COMMERCIAL

## 2024-12-16 DIAGNOSIS — G89.18 POST-OP PAIN: ICD-10-CM

## 2024-12-16 DIAGNOSIS — Z96.659 STATUS POST TOTAL KNEE REPLACEMENT, UNSPECIFIED LATERALITY: ICD-10-CM

## 2024-12-16 DIAGNOSIS — G89.29 CHRONIC PAIN OF RIGHT KNEE: Primary | ICD-10-CM

## 2024-12-16 DIAGNOSIS — M17.11 PRIMARY OSTEOARTHRITIS OF RIGHT KNEE: ICD-10-CM

## 2024-12-16 DIAGNOSIS — M25.561 CHRONIC PAIN OF RIGHT KNEE: Primary | ICD-10-CM

## 2024-12-16 PROCEDURE — 73562 X-RAY EXAM OF KNEE 3: CPT | Mod: RT

## 2024-12-16 PROCEDURE — 97161 PT EVAL LOW COMPLEX 20 MIN: CPT | Mod: GP | Performed by: PHYSICAL THERAPIST

## 2024-12-16 PROCEDURE — 99024 POSTOP FOLLOW-UP VISIT: CPT | Performed by: STUDENT IN AN ORGANIZED HEALTH CARE EDUCATION/TRAINING PROGRAM

## 2024-12-16 PROCEDURE — 73562 X-RAY EXAM OF KNEE 3: CPT | Mod: RIGHT SIDE | Performed by: RADIOLOGY

## 2024-12-16 PROCEDURE — 97110 THERAPEUTIC EXERCISES: CPT | Mod: GP | Performed by: PHYSICAL THERAPIST

## 2024-12-16 RX ORDER — OXYCODONE AND ACETAMINOPHEN 5; 325 MG/1; MG/1
1 TABLET ORAL EVERY 6 HOURS
Qty: 28 TABLET | Refills: 0 | Status: SHIPPED | OUTPATIENT
Start: 2024-12-16 | End: 2024-12-23

## 2024-12-16 ASSESSMENT — PATIENT HEALTH QUESTIONNAIRE - PHQ9
2. FEELING DOWN, DEPRESSED OR HOPELESS: NOT AT ALL
1. LITTLE INTEREST OR PLEASURE IN DOING THINGS: NOT AT ALL
SUM OF ALL RESPONSES TO PHQ9 QUESTIONS 1 AND 2: 0

## 2024-12-16 NOTE — PROGRESS NOTES
Physical Therapy  Physical Therapy Evaluation    Patient Name: Adonay Stephens  MRN: [id number]  Today's Date: 12/16/2024  Time Calculation  Start Time: 1215  Stop Time: 1300  Time Calculation (min): 45 min  PT Evaluation Time Entry  PT Evaluation (Low) Time Entry: 30  PT Therapeutic Procedures Time Entry  Therapeutic Exercise Time Entry: 15              Insurance:  Visit number: 1  Authorization info: no auth req  Insurance Type: ANTHEM BMN PT COPAY 0 DED 0 COVERAGE 100 OOP [phone]   NO AUTH REQ FOR OUTPATIENT SERVICES  REF# YUKI C [id number]     General:  Reason for visit: post OP R TKA 11/27/24  Referred by: DR Wilson    Current Problem:  1. Chronic pain of right knee            Precautions: NA         Medical History Form: Reviewed (scanned into chart)    Subjective:     Chief Complaint: Patient presents to clinic s/p R TKA 11/27/24 at [facility]  Onset Date: 11/27/24  MOOK: Chronic    Current Condition:   Better    Pain:     Location: r knee  Description: aching  Aggravating Factors:  Bending/Straightening Knee, Standing, Walking, Sitting, Squatting, and Stairs  Relieving Factors:  Rest and Ice    Relevant Information (PMH & Previous Tests/Imaging): na  Previous Interventions/Treatments: Injections, knee scopes    Prior Level of Function (PLOF)  Patient previously independent with all ADLs  Exercise/Physical Activity: walking, hunting, fishing  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 No  Fever/chills, unexplained weight changes, dizziness/fainting, unexplained change in bowel or bladder functions, unexplained malaise or muscle weakness, night pain/sweats, numbness or tingling    Objective:      Posture: good    Lower Extremity Functional Movements  Transfers: mod I  Gait: amb mod I  w/SPC step to gait w/moderate antalgia    Sigifredo LE grossly 4+-5/5 except:   R knee AAROM:   Ext -3 deg  Flex 102 deg   SLR R: major quad lag, needs assist to initiate SLR    Outcome Measures:  LEFS: 26        EDUCATION: Pt educated in HEP, PT POC, anatomy, activity avoidance, proper positioning/posture, use of SPC , pt demonstrates understanding of PT info, all questions answered.        HEP:  Supine Quad Set  reps: 10 sets: 2 daily: 2 weekly: 7  Exercise image step1 Exercise image step2  Setup  Begin lying on your back with one knee bent and your other leg straight with your knee resting on a towel roll.  Movement  Gently squeeze your thigh muscles, pushing the back of your knee down into the towel.  Tip  Make sure to keep your back flat against the floor during the exercise.  Active Straight Leg Raise with Quad Set  reps: 10 sets: 2 daily: 2 weekly: 7  Exercise image step1 Exercise image step2  Setup  Begin lying on your back with one knee bent and your other leg straight  Movement  Squeeze the thigh muscles in your straight leg and flex your foot, then slowly lift your leg until it is parallel with your other thigh. Lower your leg back to the starting position and repeat.  Tip  Make sure to keep your back flat against the floor during the exercise.  Disclaimer: This program provides exercises related to preventative maintenance OR to your condition that you can perform at home. As there is a risk of injury with any activity, use caution when performing exercises. If you experience any pain or discomfort, discontinue the exercises and contact your healthcare provider.  Login: [email or web address]    Access Code: [email or web address]    Date printed: 12/16/2024Page 1  Sidelying Hip Abduction  reps: 10 sets: 2 daily: 2 weekly: 7  Exercise image step1 Exercise image step2  Setup  Begin lying on your side with your top leg straight and your bottom leg bent.  Movement  Lift your top leg up toward the ceiling, then  slowly lower it back down and repeat.  Tip  Make sure to keep your leg straight and do not let your hips roll backward or forward during the exercise.  Supine Heel Slide with Strap  reps: 10 sets: 2 daily: 2 weekly: 7  Exercise image step1 Exercise image step2  Setup  Begin lying on your back with your legs straight, holding the ends of a strap that is looped around the bottom of one foot.  Movement  Pull on the strap, sliding your heel toward your buttocks, then slide your heel back to the starting position and repeat.  Tip  Make sure you keep your back flat against the bed during the exercise.  Disclaimer: This program provides exercises related to preventative maintenance OR to your condition that you can perform at home. As there is a risk of injury with any activity, use caution when performing exercises. If you experience any pain or discomfort, discontinue the exercises and contact your healthcare provider.  Login: [email or web address]    Access Code: [email or web address]    Date printed: 12/16/2024Page 2  Heel Raises with Counter Support  reps: 10 sets: 2 daily: 2 weekly: 7  Exercise image step1 Exercise image step2  Setup  Begin in a standing upright position with your hands resting on a counter in front of you.  Movement  Slowly raise your heels off the ground, hold briefly, then lower them back down and repeat.  Tip  Make sure to maintain an upright posture and use the counter to help you balance as needed. Do not let your ankles rotate inward or outward.  Mini Squat with Counter Support  reps: 10 sets: 2 daily: 2 weekly: 7  Exercise image step1 Exercise image step2  Setup  Begin in a standing upright position with your feet shoulder width apart and your hands resting on a counter.  Movement  Slowly bend your knees to lower into a mini squat position. Hold briefly, then press into your feet to return to a standing upright position and repeat.  Tip  Make sure to keep your heels on the ground and use the  counter to help you balance as needed. Do not let your knees bend forward past your toes or collapse inward.  Disclaimer: This program provides exercises related to preventative maintenance OR to your condition that you can perform at home. As there is a risk of injury with any activity, use caution when performing exercises. If you experience any pain or discomfort, discontinue the exercises and contact your healthcare provider.  Login: [email or web address]    Access Code: [email or web address]    Date printed: 12/16/2024Page 3  Seated Hamstring Stretch  reps: 5 sets: 1 hold: 30 daily: 2  weekly: 7  Exercise image step1 Exercise image step2  Setup  Begin sitting upright with one leg straight forward and your heel resting on the ground.  Movement  Bend your trunk forward, hinging at your hips until you feel a stretch in the back of your leg. Hold this position.  Tip  Make sure to keep your knee straight during the stretch and do not let your back arch or slump.  Goals: Set and discussed today      Plan of care was developed with input and agreement by the patient.    1) pt demo independence with advanced HEP  2) pt demo Sigifredo LE strength 4+-5/5 including good eccentric quad to perform all functional mobility  3) pt demo R knee AROM 0-120 deg to perform all functional mobility  4) pt demo  RLE SLS >/=30 sec without UE assist on compliant surface for functional carryover into dynamic balance  5) pt negotiate flight mod I reciprocally without increased R knee pain  6) pt ambulate community distance independent over varying surfaces/inclines without increased R knee pain over varying surfaces/inclines     Treatment Performed:    Therapeutic Exercise:    15 min  Demo HEP        Assessment:  pt 58 y/o M presents with s/p R TKA 11/27/24 at [facility] w/Dr Wilson. Upon examination patient demonstrates decreased AROM, decreased strength R LE limiting overall functional mobility including bed mobility, transfers, ambulation.  Activity limitations and participations restrictions include job tasks, ADLs, IADLs, . Pt to benefit from outpatient PT to address deficits, maximize functional mobility and improve QOL.  The clinical presentation of this patient is stable and their history and examination findings are consistent with a low complexity evaluation with good rehab potential.           Plan:     Planned Interventions include: therapeutic exercise, self-care home management, manual therapy, gait training, neuromuscular coordinationtherapy  Frequency: 2 x Week  Duration: 6 Weeks      Shantell Reyes, PT

## 2024-12-16 NOTE — PROGRESS NOTES
History of Present Illness:  Patient returns today endorsing moderate pain.  Denies any numbness or tingling.  No calf pain, No chest pain or shortness of breath.    Physical Examination:  Mild swelling  Healthy incision, no erythema or drainage  ROM:  5-90  + Plantar/dorsiflexion  Negative Amanda test    Imaging:  Appropriate position and alignment no evidence of implant failure     Assessment  Patient status post right total knee arthroplasty, doing well    Plan:  Discussed analgesics, encouraging non-opioid modalities.  Encouraged ice, rest.  Discussed importance of ROM/ formal physical therapy.  Reviewed dental prophylaxis.  Follow-up in 1 month for a motion check.    Rashi Rock PA-C     I have personally reviewed the OARRS report for this patient. This report is scanned into  the electronic medical record. I have considered the risks of abuse, dependence, addiction, and diversion. They currently report a pain of 8.

## 2024-12-18 ENCOUNTER — TREATMENT (OUTPATIENT)
Dept: PHYSICAL THERAPY | Facility: HOSPITAL | Age: 59
End: 2024-12-18
Payer: COMMERCIAL

## 2024-12-18 DIAGNOSIS — M25.561 CHRONIC PAIN OF RIGHT KNEE: Primary | ICD-10-CM

## 2024-12-18 DIAGNOSIS — G89.29 CHRONIC PAIN OF RIGHT KNEE: Primary | ICD-10-CM

## 2024-12-18 PROCEDURE — 97110 THERAPEUTIC EXERCISES: CPT | Mod: GP

## 2024-12-18 ASSESSMENT — PAIN DESCRIPTION - DESCRIPTORS: DESCRIPTORS: ACHING

## 2024-12-18 ASSESSMENT — PAIN - FUNCTIONAL ASSESSMENT: PAIN_FUNCTIONAL_ASSESSMENT: 0-10

## 2024-12-18 ASSESSMENT — PAIN SCALES - GENERAL: PAINLEVEL_OUTOF10: 5 - MODERATE PAIN

## 2024-12-18 NOTE — PROGRESS NOTES
Physical Therapy    Physical Therapy Treatment    Patient Name: Adonay Stephens  MRN: [id number]  Today's Date: 12/18/2024    Time Entry:   Time Calculation  Start Time: 0750  Stop Time: 0830  Time Calculation (min): 40 min     PT Therapeutic Procedures Time Entry  Therapeutic Exercise Time Entry: 40                 Insurance:  Visit number: 2  Authorization info: no auth req  Insurance Type: ANTHEM BMN PT COPAY 0 DED 0 COVERAGE 100 OOP [phone]   NO AUTH REQ FOR OUTPATIENT SERVICES  REF# YUKI C [id number]     Assessment:   Most difficulty with SLR today. Initially demonstrating a quad lag. Cues for quad set with even more difficulty to complete SLR d/t quad weakness. He was able to complete with PT assistance. Introduced LAQ with much quad shaking, but ability to complete with weakness and fatigue. Notes overall improvement with pain symptoms during exercises.   Plan:   Progress strength and ROM as able    Current Problem  1. Chronic pain of right knee            General   Patient notes he feels most stiff in the mornings.        Subjective    Precautions  Precautions  Precautions Comment: R LE WBAT       Pain  Pain Assessment  Pain Assessment: 0-10  0-10 (Numeric) Pain Score: 5 - Moderate pain  Pain Type: Acute pain, Surgical pain  Pain Location: Knee  Pain Orientation: Right  Pain Descriptors: Aching    Objective     R knee flexion: 100  R knee extension: lacking 2    Treatments:  Nustep Seat 13, arms 10: Level 3, 6 minutes    Heel slide with strap: 10x10 seconds  SLR with QS and PT asssit (moderate): x5  Side lying hip abduction: 2x10  LAQ: 20x5 seconds   Seated HS strength with overpressure: 3x30 seconds  HR/TR: x20 each  Standing hip flexion: x20    Access Code: [email or web address]  URL: [email or web address]  Date: 12/18/2024  Prepared by: Katrin Gee    Exercises  - Quad Setting and Stretching  - 1 x daily - 7 x weekly - 3 sets - 10 reps  - Active Straight Leg Raise  with Quad Set  - 2 x daily - 7 x weekly - 2 sets - 10 reps  - Sidelying Hip Abduction  - 2 x daily - 7 x weekly - 2 sets - 10 reps  - Supine Heel Slide with Strap  - 2 x daily - 7 x weekly - 1-2 sets - 10 reps - 10 seconds hold  - Heel Raises with Counter Support  - 2 x daily - 7 x weekly - 2 sets - 10 reps  - Toe Raises with Counter Support  - 1 x daily - 7 x weekly - 3 sets - 10 reps  - Mini Squat with Counter Support  - 2 x daily - 7 x weekly - 2 sets - 10 reps  - Seated Hamstring Stretch  - 2 x daily - 7 x weekly - 1 sets - 3 reps - 30 hold  - Seated Long Arc Quad  - 1 x daily - 7 x weekly - 2 sets - 10 reps - 5 seconds hold  - Standing Hip Flexion AROM  - 1 x daily - 7 x weekly - 2-3 sets - 10 reps

## 2024-12-23 ENCOUNTER — TREATMENT (OUTPATIENT)
Dept: PHYSICAL THERAPY | Facility: HOSPITAL | Age: 59
End: 2024-12-23
Payer: COMMERCIAL

## 2024-12-23 DIAGNOSIS — G89.29 CHRONIC PAIN OF RIGHT KNEE: Primary | ICD-10-CM

## 2024-12-23 DIAGNOSIS — M25.561 CHRONIC PAIN OF RIGHT KNEE: Primary | ICD-10-CM

## 2024-12-23 PROCEDURE — 97110 THERAPEUTIC EXERCISES: CPT | Mod: GP | Performed by: PHYSICAL THERAPIST

## 2024-12-23 PROCEDURE — 97140 MANUAL THERAPY 1/> REGIONS: CPT | Mod: GP | Performed by: PHYSICAL THERAPIST

## 2024-12-23 ASSESSMENT — PAIN SCALES - GENERAL: PAINLEVEL_OUTOF10: 3

## 2024-12-23 ASSESSMENT — PAIN - FUNCTIONAL ASSESSMENT: PAIN_FUNCTIONAL_ASSESSMENT: 0-10

## 2024-12-23 NOTE — PROGRESS NOTES
Physical Therapy    Physical Therapy Treatment    Patient Name: Adonay Stephens  MRN: [id number]  Today's Date: 12/23/2024    Time Entry:   Time Calculation  Start Time: 0845  Stop Time: 0932  Time Calculation (min): 47 min     PT Therapeutic Procedures Time Entry  Manual Therapy Time Entry: 10  Therapeutic Exercise Time Entry: 35                   Assessment:  Pt walking into the clinic today with his st cane with mild antalgia noting some decreased knee flexion through swing phase of gait.  His Rt knee AROM 0 to 105 degrees of flexion.   His strength is slowly progressing.  He's progressing nicely.      Plan:  Continue skilled PT    Current Problem  1. Chronic pain of right knee            Subjective  Pt reports his knee is stiff and sore.    Pain  Pain Assessment  Pain Assessment: 0-10  0-10 (Numeric) Pain Score: 3  Pain Type: Chronic pain, Surgical pain  Pain Location: Knee  Pain Orientation: Right    Objective     Rt knee AROM: 0 to 105 degrees of flexion, 110 degrees of flexion passively    Treatments:    Nustep Seat 13, arms 10: Level 3, 10 minutes    Heel slide with strap: 30 reps, 3 sec hold  SLR's, flexion and abduction, 20 reps   LAQ: 30 reps   Seated HS strength with overpressure: 3x30 seconds - NP  HR/TR, 1/2 roll, 30 reps   Standing slow high knee marching in place, 30 reps *  Standing HSC, 30 reps *    Manual/patellar mobs

## 2024-12-24 ENCOUNTER — TREATMENT (OUTPATIENT)
Dept: PHYSICAL THERAPY | Facility: HOSPITAL | Age: 59
End: 2024-12-24
Payer: COMMERCIAL

## 2024-12-24 DIAGNOSIS — M25.561 CHRONIC PAIN OF RIGHT KNEE: Primary | ICD-10-CM

## 2024-12-24 DIAGNOSIS — G89.29 CHRONIC PAIN OF RIGHT KNEE: Primary | ICD-10-CM

## 2024-12-24 PROCEDURE — 97110 THERAPEUTIC EXERCISES: CPT | Mod: GP

## 2024-12-24 ASSESSMENT — PAIN - FUNCTIONAL ASSESSMENT: PAIN_FUNCTIONAL_ASSESSMENT: 0-10

## 2024-12-24 ASSESSMENT — PAIN SCALES - GENERAL: PAINLEVEL_OUTOF10: 3

## 2024-12-24 NOTE — PROGRESS NOTES
Physical Therapy    Physical Therapy Treatment    Patient Name: Adonay Stephens  MRN: [id number]  Today's Date: 12/24/2024    Time Entry:   Time Calculation  Start Time: 0831  Stop Time: 0912  Time Calculation (min): 41 min     PT Therapeutic Procedures Time Entry  Therapeutic Exercise Time Entry: 41                 Insurance:  Visit number: 4  Authorization info: no auth req  Insurance Type: ANTHEM BMN PT COPAY 0 DED 0 COVERAGE 100 OOP [phone])   NO AUTH REQ FOR OUTPATIENT SERVICES  REF# YUKI [id number]     Assessment:   Decreased reps with all exercises today d/t increased pain following yesterdays appointment. Introduced standing leg movements with most difficulty standing on R LE with increased pain. Some improvement with R knee ROM. Cues needed for R knee flexion during gait with some improvement following cues.   Plan:   Continue to progress strength and ROM    Current Problem  1. Chronic pain of right knee            General   Patient notes that he was quite painful following yesterday's appointment and grocery shopping.        Subjective    Precautions          Pain  Pain Assessment  Pain Assessment: 0-10  0-10 (Numeric) Pain Score: 3  Pain Type: Chronic pain, Surgical pain  Pain Location: Knee  Pain Orientation: Right    Objective     R knee flexion: 105*    Treatments:  Nustep Seat 13, arms 10: Level 3, 10 minutes    Heel slide with strap: 10x10 seconds  Standing hip flexion/abduction/extension: 2x10  SLR's, flexion and abduction, ---   Mini squat: x20  LAQ: ---  Seated HS strength with overpressure: 3x30 seconds ---  HR/TR, 1/2 roll, 20 reps   Standing slow high knee marching in place, ---  Standing HSC, x20     Manual/patellar mobs (not today)

## 2024-12-30 ENCOUNTER — TREATMENT (OUTPATIENT)
Dept: PHYSICAL THERAPY | Facility: HOSPITAL | Age: 59
End: 2024-12-30
Payer: COMMERCIAL

## 2024-12-30 DIAGNOSIS — G89.29 CHRONIC PAIN OF RIGHT KNEE: Primary | ICD-10-CM

## 2024-12-30 DIAGNOSIS — M25.561 CHRONIC PAIN OF RIGHT KNEE: Primary | ICD-10-CM

## 2024-12-30 PROCEDURE — 97116 GAIT TRAINING THERAPY: CPT | Mod: GP

## 2024-12-30 PROCEDURE — 97110 THERAPEUTIC EXERCISES: CPT | Mod: GP

## 2024-12-30 ASSESSMENT — PAIN - FUNCTIONAL ASSESSMENT: PAIN_FUNCTIONAL_ASSESSMENT: 0-10

## 2024-12-30 ASSESSMENT — PAIN SCALES - GENERAL: PAINLEVEL_OUTOF10: 4

## 2024-12-30 NOTE — PROGRESS NOTES
Physical Therapy    Physical Therapy Treatment    Patient Name: Adonay Stephens  MRN: [id number]  Today's Date: 12/30/2024    Time Entry:   Time Calculation  Start Time: 1130  Stop Time: 1210  Time Calculation (min): 40 min     PT Therapeutic Procedures Time Entry  Therapeutic Exercise Time Entry: 32  Gait Training Time Entry: 8                 Insurance:  Visit number: 5  Authorization info: no auth req  Insurance Type: ANTHEM BMN PT COPAY 0 DED 0 COVERAGE 100 OOP [phone])   NO AUTH REQ FOR OUTPATIENT SERVICES  REF# YUKI C [id number]        Assessment:   Patient is demonstrating improved R knee ROM. Gait training performed on treadmill today to assist with improving gait mechanics as he had continued to ambulate with minimal R knee flexion. He demonstrates much tenderness and pain along R hip adductor which is likely caused by gait compensations. Educated in stretch and use of rolling pin along irritated tissue. Resistance added with 3 way kick with some increased difficulty. Introduced step ups. Much UE use needed with 6 inch, decreased to 4 inches with improved performance. Muscular fatigue observed upon completion of treatment as expected.   Plan:   Continue to progress strength and ROM     Current Problem  1. Chronic pain of right knee            General   He notes that he tried not to take muscle relaxer and pain medication last night. Notes that was a mistake and he was up most of the night. Took pain meds about 1 hour prior to PT today.        Subjective    Precautions  Precautions  Precautions Comment: R TKA WBAT       Pain  Pain Assessment  Pain Assessment: 0-10  0-10 (Numeric) Pain Score: 4  Pain Type: Chronic pain, Surgical pain  Pain Location: Knee  Pain Orientation: Right    Objective     R knee flexion: 115*  R knee extension: 0*    Treatments:  Nustep Seat 13, arms 10: Level 3, 10 minutes    Heel slide with strap: 10x10 seconds  Hip adductor stretch in supine: 4x10  "seconds  Step up F/L: 4\" x15 each way  Standing hip flexion/abduction/extension: RTB 2x10  Standing HSC: RTB x15  Mini squat: x20    SLR's, flexion and abduction, ---   LAQ: ---  Seated HS strength with overpressure:  ---  HR/TR, 1/2 roll, ---   Standing slow high knee marching in place, ---       Gait training on TM, 0.5-0.7 mph (8 minutes)  Cues for R heel strike, push off, knee flexion, hip flexion     "

## 2025-01-02 ENCOUNTER — TREATMENT (OUTPATIENT)
Dept: PHYSICAL THERAPY | Facility: HOSPITAL | Age: 60
End: 2025-01-02
Payer: COMMERCIAL

## 2025-01-02 DIAGNOSIS — G89.29 CHRONIC PAIN OF RIGHT KNEE: Primary | ICD-10-CM

## 2025-01-02 DIAGNOSIS — M25.561 CHRONIC PAIN OF RIGHT KNEE: Primary | ICD-10-CM

## 2025-01-02 PROCEDURE — 97110 THERAPEUTIC EXERCISES: CPT | Mod: GP,CQ

## 2025-01-02 ASSESSMENT — PAIN SCALES - GENERAL: PAINLEVEL_OUTOF10: 3

## 2025-01-02 ASSESSMENT — PAIN - FUNCTIONAL ASSESSMENT: PAIN_FUNCTIONAL_ASSESSMENT: 0-10

## 2025-01-02 NOTE — PROGRESS NOTES
"Physical Therapy Treatment    Patient Name: Adonay Stephens  MRN: [id number]  Today's Date: 1/2/2025  Time Calculation  Start Time: 1130  Stop Time: 1212  Time Calculation (min): 42 min  PT Therapeutic Procedures Time Entry  Therapeutic Exercise Time Entry: 40       Current Problem  1. Chronic pain of right knee            Subjective   General   Pt stating persistent AM stiffness, but otherwise doing well to this point.   Insurance   Visit number: 6  Authorization info: no auth req  Insurance Type: ANTHEM BMN PT COPAY 0 DED 0 COVERAGE 100 OOP [phone])   NO AUTH REQ FOR OUTPATIENT SERVICES  REF# YUKI C [id number]   Precautions  Precautions  Precautions Comment: R TKA WBAT  Pain  Pain Assessment: 0-10  0-10 (Numeric) Pain Score: 3  Pain Location: Knee  Pain Orientation: Right    Objective   R knee ROM 0 to 120 degrees assisted   Treatments:  Nustep Seat 13, arms 10: Level 3, 10 minutes   Heel slides with strap 5\" 2x10  Quad set with heel prop 5\": 2x10  SLR flex/abd 2x10  LAQ 3# 2\" 2x10  Squats 2x10  HR/TR 1/2 roll x20  TKE ball on wall 5\" 2x10  Standing hip 3-way   Step ups f/l 6in 2x10  Step overs 4in x10  SLS     Assessment   Pt ambulates into clinic with SPC mild to moderate antalgia. Motion continues to improved with increased quad strength and control. Still with a few degrees quad lag noted SLR/LAQ. Added TKE to address and step overs for eccentric control. Cont ex's to progress strength quad and glute.  Plan:  Cont to progress strength and endurance     OP EDUCATION:       Goals:     "

## 2025-01-03 ENCOUNTER — TELEPHONE (OUTPATIENT)
Dept: PHYSICAL THERAPY | Facility: HOSPITAL | Age: 60
End: 2025-01-03
Payer: COMMERCIAL

## 2025-01-03 DIAGNOSIS — M25.561 CHRONIC PAIN OF RIGHT KNEE: Primary | ICD-10-CM

## 2025-01-03 DIAGNOSIS — G89.29 CHRONIC PAIN OF RIGHT KNEE: Primary | ICD-10-CM

## 2025-01-03 NOTE — TELEPHONE ENCOUNTER
CALL BACK RCVD FROM PT; APPT SCHED 01/17/25 RS TO 01/16 @ 11:45AM. PT AV APPT WILL BE SHORT OF 30 MIN -VS- 45MIN. PT AGREED TO SCHED/APPT CONFIRMED

## 2025-01-06 ENCOUNTER — TREATMENT (OUTPATIENT)
Dept: PHYSICAL THERAPY | Facility: HOSPITAL | Age: 60
End: 2025-01-06
Payer: COMMERCIAL

## 2025-01-06 DIAGNOSIS — M25.561 CHRONIC PAIN OF RIGHT KNEE: Primary | ICD-10-CM

## 2025-01-06 DIAGNOSIS — G89.29 CHRONIC PAIN OF RIGHT KNEE: Primary | ICD-10-CM

## 2025-01-06 PROCEDURE — 97110 THERAPEUTIC EXERCISES: CPT | Mod: GP,CQ

## 2025-01-06 ASSESSMENT — PAIN SCALES - GENERAL: PAINLEVEL_OUTOF10: 2

## 2025-01-06 ASSESSMENT — PAIN - FUNCTIONAL ASSESSMENT: PAIN_FUNCTIONAL_ASSESSMENT: 0-10

## 2025-01-06 NOTE — PROGRESS NOTES
"Physical Therapy Treatment    Patient Name: Adonay Stephens  MRN: [id number]  Today's Date: 1/6/2025     PT Therapeutic Procedures Time Entry  Therapeutic Exercise Time Entry: 44                   Current Problem  1. Chronic pain of right knee            Insurance   ANTHEM BMN PT COPAY 0 DED 0     Visit # 7/12    Subjective   Pt stating his knee is doing a lot better, sleeping better but continues to feel stiff.     Pain  Pain Assessment: 0-10  0-10 (Numeric) Pain Score: 2  Pain Location: Knee  Pain Orientation: Right      Objective   R knee AAROM 122 AROM 107  Treatments:  Nustep Seat 13, arms 10: Level 3, 10 minutes   Heel slides with strap 5\" 2x10  SLR flex/abd 2x15  LAQ 3# 3\" 2x15  Squats 2x15  HR/TR 1/2 roll --  TKE ball on wall 5\" --  Side stepping GTB in // bars x5  Step ups f/l 6in 2x10  Stairs: x5   SLS 10\"x10    Assessment:  Increased reps with most exercises displaying muscle fatigue. Able to ascend/descend stairs in a reciprocal pattern noticing some difficulty with descending secondary to decreased quad strength. Added SLS to improve balance needing fingertip support. Updated HEP and reviewed.     Plan:  Cont to progress strength        "

## 2025-01-09 ENCOUNTER — TREATMENT (OUTPATIENT)
Dept: PHYSICAL THERAPY | Facility: HOSPITAL | Age: 60
End: 2025-01-09
Payer: COMMERCIAL

## 2025-01-09 DIAGNOSIS — G89.29 CHRONIC PAIN OF RIGHT KNEE: Primary | ICD-10-CM

## 2025-01-09 DIAGNOSIS — M25.561 CHRONIC PAIN OF RIGHT KNEE: Primary | ICD-10-CM

## 2025-01-09 PROCEDURE — 97110 THERAPEUTIC EXERCISES: CPT | Mod: GP,CQ

## 2025-01-09 ASSESSMENT — PAIN - FUNCTIONAL ASSESSMENT: PAIN_FUNCTIONAL_ASSESSMENT: 0-10

## 2025-01-09 ASSESSMENT — PAIN SCALES - GENERAL: PAINLEVEL_OUTOF10: 2

## 2025-01-09 NOTE — PROGRESS NOTES
"Physical Therapy Treatment    Patient Name: Adonay Stephens  MRN: [id number]  Today's Date: 1/9/2025  Time Calculation  Start Time: 1127  Stop Time: 1210  Time Calculation (min): 43 min  PT Therapeutic Procedures Time Entry  Therapeutic Exercise Time Entry: 40       Current Problem  1. Chronic pain of right knee            Subjective   General   Pt stating stiffness and soreness R knee  Insurance   ANTHEM BMN PT COPAY 0 DED 0      Visit # 8/12  Precautions     Pain  Pain Assessment: 0-10  0-10 (Numeric) Pain Score: 2  Pain Type: Surgical pain  Pain Location: Knee  Pain Orientation: Right    Objective   Treatments:  Nustep Seat 13, arms 10: Level 3, 10 minutes   Heel slides with strap 5\" 2x10  SLR flex/abd 2x15  SAQ 3# 3\" 2x15  LAQ 3# 3\" 2x15  Squats 2x15  HR/TR 1/2 roll x20  TKE ball on wall 5\" x20  Step ups f/l 6in 2x10    Side stepping GTB in // bars --  Stairs: --   SLS 10\"--    Assessment   Pt ambulates into clinic with SPC mild antalgia. Able to ambulate throughout clinic IND. Some stiffness end range flexion, but motion well beyond functional at this point. Still with a few degrees quad lag noted SLR/LAQ. Added SAQ and cont ex's to progress strength quad and glute, particularly end range knee ext.   Plan:  Cont to progress strength and endurance     OP EDUCATION:       Goals:     "

## 2025-01-13 ENCOUNTER — TREATMENT (OUTPATIENT)
Dept: PHYSICAL THERAPY | Facility: HOSPITAL | Age: 60
End: 2025-01-13
Payer: COMMERCIAL

## 2025-01-13 ENCOUNTER — APPOINTMENT (OUTPATIENT)
Dept: ORTHOPEDIC SURGERY | Facility: CLINIC | Age: 60
End: 2025-01-13
Payer: COMMERCIAL

## 2025-01-13 DIAGNOSIS — M17.11 PRIMARY OSTEOARTHRITIS OF RIGHT KNEE: Primary | ICD-10-CM

## 2025-01-13 DIAGNOSIS — G89.29 CHRONIC PAIN OF RIGHT KNEE: Primary | ICD-10-CM

## 2025-01-13 DIAGNOSIS — M25.561 CHRONIC PAIN OF RIGHT KNEE: Primary | ICD-10-CM

## 2025-01-13 PROCEDURE — 97110 THERAPEUTIC EXERCISES: CPT | Mod: GP,CQ

## 2025-01-13 PROCEDURE — 99211 OFF/OP EST MAY X REQ PHY/QHP: CPT | Performed by: ORTHOPAEDIC SURGERY

## 2025-01-13 ASSESSMENT — PAIN - FUNCTIONAL ASSESSMENT: PAIN_FUNCTIONAL_ASSESSMENT: 0-10

## 2025-01-13 ASSESSMENT — PAIN SCALES - GENERAL: PAINLEVEL_OUTOF10: 0 - NO PAIN

## 2025-01-13 NOTE — PROGRESS NOTES
"Physical Therapy Treatment    Patient Name: Adonay Stephens  MRN: [id number]  Today's Date: 1/13/2025     PT Therapeutic Procedures Time Entry  Therapeutic Exercise Time Entry: 41                   Current Problem  1. Chronic pain of right knee            Insurance   ANTHEM BMN PT COPAY 0 DED 0      Visit # 9/12      Subjective   Pt stating his knee has been doing okay, still gives out at times. Follows up with Dr. Wilson today.     Pain  Pain Assessment: 0-10  0-10 (Numeric) Pain Score: 0 - No pain      Objective     Treatments:  Nustep Seat 13, arms 10: Level 3, 9 minutes   SLR flex/abd 2x15  SAQ 3# 3\" 2x15  LAQ 3# 3\" 2x15  Squats 2x15  HR/TR 1/2 roll x20  TKE ball on wall 5\" x20  Step ups f/l 6in 2x10  Stairs x3  Heel taps 4in x10  SLS on foam 10\"x10    Assessment:  Added heel taps to increase quad strength. Progressed balance onto dynamic surface which was challenging. Pt continues to display decreased eccentric quad strength with visible quad lag noted. Pt reporting fatigue during treatment.     Plan:  Progress eccentric quad control      "

## 2025-01-13 NOTE — PROGRESS NOTES
History of Present Illness:  Patient returns today endorsing mild pain.  Patient notes improving functional status.    Physical Examination:  Well healed incision   ROM: 0-120  No laxity to varus / valgus stress  + Plantar/dorsiflexion  Negative Amanda test    Assessment:  Patient status post right total knee arthroplasty, doing excellent    Plan:  Discussed analgesics.  Reviewed range of motion, strength goals.  Discussed activities to avoid  Dental prophylaxis discussed.  Follow-up:  2 months

## 2025-01-14 DIAGNOSIS — M17.9 OSTEOARTHRITIS OF KNEE, UNSPECIFIED LATERALITY, UNSPECIFIED OSTEOARTHRITIS TYPE: ICD-10-CM

## 2025-01-14 DIAGNOSIS — E78.00 ELEVATED CHOLESTEROL: ICD-10-CM

## 2025-01-15 RX ORDER — ROSUVASTATIN CALCIUM 10 MG/1
10 TABLET, COATED ORAL DAILY
Qty: 30 TABLET | Refills: 2 | Status: SHIPPED | OUTPATIENT
Start: 2025-01-15

## 2025-01-15 RX ORDER — CELECOXIB 200 MG/1
200 CAPSULE ORAL DAILY
Qty: 30 CAPSULE | Refills: 2 | Status: SHIPPED | OUTPATIENT
Start: 2025-01-15

## 2025-01-16 ENCOUNTER — APPOINTMENT (OUTPATIENT)
Dept: ORTHOPEDIC SURGERY | Facility: CLINIC | Age: 60
End: 2025-01-16
Payer: COMMERCIAL

## 2025-01-17 ENCOUNTER — APPOINTMENT (OUTPATIENT)
Dept: PHYSICAL THERAPY | Facility: HOSPITAL | Age: 60
End: 2025-01-17
Payer: COMMERCIAL

## 2025-01-17 DIAGNOSIS — G89.29 CHRONIC PAIN OF RIGHT KNEE: Primary | ICD-10-CM

## 2025-01-17 DIAGNOSIS — M25.561 CHRONIC PAIN OF RIGHT KNEE: Primary | ICD-10-CM

## 2025-01-20 ENCOUNTER — APPOINTMENT (OUTPATIENT)
Dept: PHYSICAL THERAPY | Facility: HOSPITAL | Age: 60
End: 2025-01-20
Payer: COMMERCIAL

## 2025-01-20 DIAGNOSIS — G89.29 CHRONIC PAIN OF RIGHT KNEE: Primary | ICD-10-CM

## 2025-01-20 DIAGNOSIS — M25.561 CHRONIC PAIN OF RIGHT KNEE: Primary | ICD-10-CM

## 2025-01-23 ENCOUNTER — APPOINTMENT (OUTPATIENT)
Dept: PHYSICAL THERAPY | Facility: HOSPITAL | Age: 60
End: 2025-01-23
Payer: COMMERCIAL

## 2025-01-23 DIAGNOSIS — M25.561 CHRONIC PAIN OF RIGHT KNEE: Primary | ICD-10-CM

## 2025-01-23 DIAGNOSIS — G89.29 CHRONIC PAIN OF RIGHT KNEE: Primary | ICD-10-CM

## 2025-01-30 ENCOUNTER — TREATMENT (OUTPATIENT)
Dept: PHYSICAL THERAPY | Facility: HOSPITAL | Age: 60
End: 2025-01-30
Payer: COMMERCIAL

## 2025-01-30 DIAGNOSIS — M25.561 CHRONIC PAIN OF RIGHT KNEE: Primary | ICD-10-CM

## 2025-01-30 DIAGNOSIS — G89.29 CHRONIC PAIN OF RIGHT KNEE: Primary | ICD-10-CM

## 2025-01-30 PROCEDURE — 97110 THERAPEUTIC EXERCISES: CPT | Mod: GP

## 2025-01-30 ASSESSMENT — PAIN SCALES - GENERAL: PAINLEVEL_OUTOF10: 2

## 2025-01-30 ASSESSMENT — PAIN DESCRIPTION - DESCRIPTORS: DESCRIPTORS: THROBBING

## 2025-01-30 ASSESSMENT — PAIN - FUNCTIONAL ASSESSMENT: PAIN_FUNCTIONAL_ASSESSMENT: 0-10

## 2025-01-30 NOTE — PROGRESS NOTES
Physical Therapy    Physical Therapy Treatment    Patient Name: Adonay Stephens  MRN: [id number]  Today's Date: 2025    Time Entry:   Time Calculation  Start Time: 1314  Stop Time: 1352  Time Calculation (min): 38 min     PT Therapeutic Procedures Time Entry  Therapeutic Exercise Time Entry: 38                 Insurance   ANTHEM BMN PT COPAY 0 DED 0      Visit # 10/12    Assessment:   Patient has attended 10 PT visits s/p R TKA. He has met all but 2 goals at this time. He demonstrates difficulty with single leg balance, but this is B lower extremities. He also demonstrates weakness to R gluteus medius. HEP progressed to strength. Able to perform with muscular fatigue. D/t overall improvement, goal completion and compliance with HEP, patient to be discharged to [facility]  Plan:   Discharge to [facility]    Current Problem  1. Chronic pain of right knee            General   Notes 75-80% overall improvement since surgery. Use of SPC PRN. Holding shopping cart while at work.        Subjective    Precautions          Pain  Pain Assessment  Pain Assessment: 0-10  0-10 (Numeric) Pain Score: 2  Pain Location: Knee  Pain Orientation: Right  Pain Descriptors: Throbbing    Objective                            ROM  Right knee flexion: 122   Right knee extension: 0            MMT  Right quadriceps: 4+/5    Right iliopsoas: 4+/5  Right gluteus medius: 4+/5  Right gluteus kallie: 4-/5  Right hamstrin+/5             Treatments:  Prone hip extension with knee flexion: 2x10  Prone hip extension with knee extended: 2x10  Bridge: x20  Stairs x3  Heel taps F/L 4in x10 each   SLS 10 seconds    Access Code: [email or web address]  URL: [email or web address]  Date: 2025  Prepared by: Katrin Gee    Exercises  - Prone Hip Extension with Bent Knee  - 1 x daily - 7 x weekly - 3 sets - 10 reps  - Prone Hip Extension  - 1 x daily - 7 x weekly - 3 sets - 10 reps  - Supine Bridge  - 1 x daily - 7 x weekly - 3 sets - 10 reps  -  Standing 3-Way Leg Reach with Resistance at Ankles and Counter Support  - 1 x daily - 7 x weekly - 3 sets - 10 reps  - Side Stepping with Resistance at Feet  - 1 x daily - 7 x weekly - 3 sets - 10 reps  - Sidelying Hip Abduction  - 1 x daily - 7 x weekly - 3 sets - 10 reps  - Supine Active Straight Leg Raise  - 1 x daily - 7 x weekly - 3 sets - 10 reps  - Forward Step Down Touch with Heel  - 1 x daily - 7 x weekly - 2 sets - 10 reps  - Single Leg Stance  - 1 x daily - 7 x weekly - 3 sets - 10 reps    OP EDUCATION:       Goals:  1) pt demo independence with advanced HEP (MET)  2) pt demo Sigifredo LE strength 4+-5/5 including good eccentric quad to perform all functional mobility (PROGRESSING)  3) pt demo R knee AROM 0-120 deg to perform all functional mobility (MET)   4) pt demo  RLE SLS >/=30 sec without UE assist on compliant surface for functional carryover into dynamic balance (PROGRESSING)   5) pt negotiate flight mod I reciprocally without increased R knee pain (MET)  6) pt ambulate community distance independent over varying surfaces/inclines without increased R knee pain over varying surfaces/inclines (MET)

## 2025-03-06 NOTE — PROGRESS NOTES
"Subjective   Patient ID: Adonay Stephens is a 59 y.o. male who presents for Annual Exam and Hyperlipidemia.  HPI    Patient presents today for a annual exam and follow-up on Hyperlipidemia. Does follow a low fat diet. He is watching salt. He drinks caffeine. Drinks Coke zero. Does not check sugar or BP at home. Does exercise. Denies chest pain, SOB.    Patient is s/p right total knee replacement. He is still having soreness with walking. Rates pain 3-4/10 currently.    Last Cologuard on 4/3/23 was negative.    Flu and [place] vaccines are up-to-date.    Overall doing well.      Review of systems  ; Patient seen today for exam denies any problems with headaches or vision, denies any shortness of breath chest pain nausea or vomiting, no black stool no blood in the stool no heartburn type symptoms denies any problems with constipation or diarrhea, and no dysuria-type symptoms    The patient's allergies medications were reviewed with them today    The patient's social family and surgical history or also reviewed here today, along with her past medical history.     Objective     Alert and active in  no acute distress  HEENT TMs clear oropharynx negative nares clear no drainage noted neck supple  With no adenopathy   Heart regular rate and rhythm without murmur and no carotid bruits  Lungs- clear to auscultation bilaterally, no wheeze or rhonchi noted  Thyroid -negative masses or nodularity  Abdomen- soft times four quadrants , bowel sounds positive no masses or organomegaly, negative tenderness guarding or rebound  Neurological exam unremarkable- DTRs in upper and lower extremities within normal limits.   skin -no lesions noted      /86 (BP Location: Left arm, Patient Position: Sitting, BP Cuff Size: Adult)   Pulse 89   Temp 35.9 °C (96.7 °F) (Temporal)   Ht 1.88 m (6' 2\")   Wt 121 kg (266 lb 6.4 oz)   SpO2 96%   BMI 34.20 kg/m²     No Known Allergies    Assessment/Plan   Problem List Items Addressed This " Visit    None  Visit Diagnoses       Routine general medical examination at a health care facility    -  Primary    Relevant Orders    Lipid Panel    PSA    BMI 34.0-34.9,adult        Class 1 obesity due to excess calories with serious comorbidity and body mass index (BMI) of 34.0 to 34.9 in adult                Patient is overall doing well with no new complaints or concerns. He will continue his current medication regimen.    Labs have been ordered, she/he will have these performed and we will contact her/him with results.  (Lipid, PSA)    BP elevated today at 142/90.  Monitor at home with goal BP<130/80.  Watch salt and caffeine intake.    Wear shoes with good arch support, such as  tennis shoes, HOKA walking shoes and it will benefit his knee.    If anything worsens or changes please call us at once, follow up in the office as planned,       Scribe Attestation  By signing my name below, IChristiana, Scribe   attest that this documentation has been prepared under the direction and in the presence of Teofilo Banerjee DO.

## 2025-03-07 ENCOUNTER — OFFICE VISIT (OUTPATIENT)
Dept: PRIMARY CARE | Facility: CLINIC | Age: 60
End: 2025-03-07
Payer: COMMERCIAL

## 2025-03-07 VITALS
BODY MASS INDEX: 34.19 KG/M2 | HEIGHT: 74 IN | OXYGEN SATURATION: 96 % | DIASTOLIC BLOOD PRESSURE: 86 MMHG | SYSTOLIC BLOOD PRESSURE: 136 MMHG | WEIGHT: 266.4 LBS | HEART RATE: 89 BPM | TEMPERATURE: 96.7 F

## 2025-03-07 DIAGNOSIS — E66.09 CLASS 1 OBESITY DUE TO EXCESS CALORIES WITH SERIOUS COMORBIDITY AND BODY MASS INDEX (BMI) OF 34.0 TO 34.9 IN ADULT: ICD-10-CM

## 2025-03-07 DIAGNOSIS — Z00.00 ROUTINE GENERAL MEDICAL EXAMINATION AT A HEALTH CARE FACILITY: Primary | ICD-10-CM

## 2025-03-07 DIAGNOSIS — E66.811 CLASS 1 OBESITY DUE TO EXCESS CALORIES WITH SERIOUS COMORBIDITY AND BODY MASS INDEX (BMI) OF 34.0 TO 34.9 IN ADULT: ICD-10-CM

## 2025-03-07 PROCEDURE — 99396 PREV VISIT EST AGE 40-64: CPT | Performed by: FAMILY MEDICINE

## 2025-03-07 PROCEDURE — 3008F BODY MASS INDEX DOCD: CPT | Performed by: FAMILY MEDICINE

## 2025-03-07 PROCEDURE — 1036F TOBACCO NON-USER: CPT | Performed by: FAMILY MEDICINE

## 2025-03-07 ASSESSMENT — PROMIS GLOBAL HEALTH SCALE
RATE_AVERAGE_FATIGUE: MILD
RATE_PHYSICAL_HEALTH: VERY GOOD
RATE_SOCIAL_SATISFACTION: VERY GOOD
EMOTIONAL_PROBLEMS: RARELY
CARRYOUT_SOCIAL_ACTIVITIES: VERY GOOD
RATE_GENERAL_HEALTH: VERY GOOD
RATE_QUALITY_OF_LIFE: VERY GOOD
RATE_AVERAGE_PAIN: 3
CARRYOUT_PHYSICAL_ACTIVITIES: MOSTLY
RATE_MENTAL_HEALTH: VERY GOOD

## 2025-03-10 ENCOUNTER — APPOINTMENT (OUTPATIENT)
Dept: PRIMARY CARE | Facility: CLINIC | Age: 60
End: 2025-03-10
Payer: COMMERCIAL

## 2025-03-10 ENCOUNTER — OFFICE VISIT (OUTPATIENT)
Dept: ORTHOPEDIC SURGERY | Facility: CLINIC | Age: 60
End: 2025-03-10
Payer: COMMERCIAL

## 2025-03-10 DIAGNOSIS — M17.11 PRIMARY OSTEOARTHRITIS OF RIGHT KNEE: Primary | ICD-10-CM

## 2025-03-10 PROCEDURE — 99213 OFFICE O/P EST LOW 20 MIN: CPT | Performed by: ORTHOPAEDIC SURGERY

## 2025-03-10 RX ORDER — METHYLPREDNISOLONE 4 MG/1
4 TABLET ORAL ONCE
Qty: 21 TABLET | Refills: 0 | Status: SHIPPED | OUTPATIENT
Start: 2025-03-10 | End: 2025-03-10

## 2025-03-10 NOTE — PROGRESS NOTES
History of Present Illness:  Patient returns today endorsing minimal pain.  Patient notes improving functional status.    Physical Examination:  Well healed incision   ROM: full  No laxity to varus / valgus stress  + Plantar/dorsiflexion  Negative Amanda test    Assessment:  Patient status post right total knee arthroplasty, doing well    Plan:  Discussed improvement in strength, swelling over the course of the first year post op. MDP for swelling.  Discussed return to activities and activities to avoid.  Dental prophylaxis discussed.  Follow-up:  Not needed at this time

## 2025-04-27 DIAGNOSIS — E78.00 ELEVATED CHOLESTEROL: ICD-10-CM

## 2025-04-28 RX ORDER — ROSUVASTATIN CALCIUM 10 MG/1
10 TABLET, COATED ORAL DAILY
Qty: 30 TABLET | Refills: 4 | Status: SHIPPED | OUTPATIENT
Start: 2025-04-28

## (undated) DEVICE — GLOVE, SURGICAL, PROTEXIS PI , 7.5, PF, LF

## (undated) DEVICE — SUTURE, MONOCRYL, 3-0, PS-1 27IN, UNDYED

## (undated) DEVICE — STRAP, VELCRO, BODY, 4 X 60IN, NS

## (undated) DEVICE — BANDAGE, ESMARK, 6 IN X 9 FT, STERILE

## (undated) DEVICE — COVER, MAYO STAND, W/PAD, 23 IN, DISPOSABLE, PLASTIC, LF, STERILE

## (undated) DEVICE — DRESSING, ABDOMINAL PAD, CURITY, 7.5 X 8 IN

## (undated) DEVICE — BANDAGE, ELASTIC, MATRIX, SELF-CLOSURE, 6 IN X 5 YD, LF

## (undated) DEVICE — DRESSING, GAUZE, SPONGE, 12 PLY, 4 X 4 IN, PLASTIC POUCH, STRL 10PK

## (undated) DEVICE — DRESSING, GAUZE, PETROLATUM, STRIP, XEROFORM, 1 X 8 IN, STERILE

## (undated) DEVICE — GLOVE, SURGICAL, PROTEXIS PI BLUE W/NEUTHERA, 8.0, PF, LF

## (undated) DEVICE — STRIP, SKIN CLOSURE, STERI-STRIP, REINFORCED, 0.25 X 4 IN

## (undated) DEVICE — MANIFOLD, 4 PORT NEPTUNE STANDARD

## (undated) DEVICE — STRAP, ARM BOARD, 32 X 1.5

## (undated) DEVICE — GLOVE, SURGICAL, PROTEXIS PI BLUE W/NEUTHERA, 7.5, PF, LF

## (undated) DEVICE — MAT, FLOOR, STANDARD FLUID BARRIER, 32X44, GREEN

## (undated) DEVICE — PREP, IODOPHOR, W/ALCOHOL, DURAPREP, W/APPLICATOR, 26 CC

## (undated) DEVICE — TUBING, PATIENT 8FT STERILE

## (undated) DEVICE — CUFF, TOURNIQUET, DUAL PORT, SNG BLADDER, 30 IN, PLC

## (undated) DEVICE — TUBING, SUCTION, 6MM X 10, CLEAN N-COND

## (undated) DEVICE — Device

## (undated) DEVICE — PADDING, CAST, SPECIALIST, 6 IN X 4 YD, STERILE

## (undated) DEVICE — GLOVE, SURGICAL, PROTEXIS PI , 6.5, PF, LF

## (undated) DEVICE — TUBING, PUMP REDEUCE 8FT STERILE

## (undated) DEVICE — GLOVE, SURGICAL, PROTEXIS PI , 7.0, PF, LF

## (undated) DEVICE — GOWN, SURGICAL, ROYAL SILK, XL, STERILE